# Patient Record
Sex: FEMALE | Race: WHITE | NOT HISPANIC OR LATINO | URBAN - METROPOLITAN AREA
[De-identification: names, ages, dates, MRNs, and addresses within clinical notes are randomized per-mention and may not be internally consistent; named-entity substitution may affect disease eponyms.]

---

## 2023-09-13 ENCOUNTER — INPATIENT (INPATIENT)
Facility: HOSPITAL | Age: 83
LOS: 5 days | Discharge: HOME HEALTH | DRG: 247 | End: 2023-09-19
Attending: STUDENT IN AN ORGANIZED HEALTH CARE EDUCATION/TRAINING PROGRAM | Admitting: INTERNAL MEDICINE
Payer: MEDICARE

## 2023-09-13 VITALS
OXYGEN SATURATION: 100 % | HEART RATE: 89 BPM | RESPIRATION RATE: 18 BRPM | TEMPERATURE: 98 F | WEIGHT: 179.9 LBS | SYSTOLIC BLOOD PRESSURE: 200 MMHG | HEIGHT: 66 IN | DIASTOLIC BLOOD PRESSURE: 95 MMHG

## 2023-09-13 DIAGNOSIS — I21.3 ST ELEVATION (STEMI) MYOCARDIAL INFARCTION OF UNSPECIFIED SITE: ICD-10-CM

## 2023-09-13 LAB
ALBUMIN SERPL ELPH-MCNC: 4.8 G/DL — SIGNIFICANT CHANGE UP (ref 3.5–5.2)
ALP SERPL-CCNC: 81 U/L — SIGNIFICANT CHANGE UP (ref 30–115)
ALT FLD-CCNC: 15 U/L — SIGNIFICANT CHANGE UP (ref 0–41)
ANION GAP SERPL CALC-SCNC: 12 MMOL/L — SIGNIFICANT CHANGE UP (ref 7–14)
APTT BLD: 25.7 SEC — LOW (ref 27–39.2)
AST SERPL-CCNC: 34 U/L — SIGNIFICANT CHANGE UP (ref 0–41)
BASOPHILS # BLD AUTO: 0.1 K/UL — SIGNIFICANT CHANGE UP (ref 0–0.2)
BASOPHILS NFR BLD AUTO: 0.9 % — SIGNIFICANT CHANGE UP (ref 0–1)
BILIRUB SERPL-MCNC: 0.4 MG/DL — SIGNIFICANT CHANGE UP (ref 0.2–1.2)
BUN SERPL-MCNC: 16 MG/DL — SIGNIFICANT CHANGE UP (ref 10–20)
CALCIUM SERPL-MCNC: 9.5 MG/DL — SIGNIFICANT CHANGE UP (ref 8.4–10.5)
CHLORIDE SERPL-SCNC: 99 MMOL/L — SIGNIFICANT CHANGE UP (ref 98–110)
CO2 SERPL-SCNC: 28 MMOL/L — SIGNIFICANT CHANGE UP (ref 17–32)
CREAT SERPL-MCNC: 0.8 MG/DL — SIGNIFICANT CHANGE UP (ref 0.7–1.5)
EGFR: 73 ML/MIN/1.73M2 — SIGNIFICANT CHANGE UP
EOSINOPHIL # BLD AUTO: 0.17 K/UL — SIGNIFICANT CHANGE UP (ref 0–0.7)
EOSINOPHIL NFR BLD AUTO: 1.6 % — SIGNIFICANT CHANGE UP (ref 0–8)
GLUCOSE BLDC GLUCOMTR-MCNC: 102 MG/DL — HIGH (ref 70–99)
GLUCOSE SERPL-MCNC: 138 MG/DL — HIGH (ref 70–99)
HCT VFR BLD CALC: 40.2 % — SIGNIFICANT CHANGE UP (ref 37–47)
HGB BLD-MCNC: 13.1 G/DL — SIGNIFICANT CHANGE UP (ref 12–16)
IMM GRANULOCYTES NFR BLD AUTO: 0.2 % — SIGNIFICANT CHANGE UP (ref 0.1–0.3)
INR BLD: 1.05 RATIO — SIGNIFICANT CHANGE UP (ref 0.65–1.3)
LYMPHOCYTES # BLD AUTO: 3.92 K/UL — HIGH (ref 1.2–3.4)
LYMPHOCYTES # BLD AUTO: 36.3 % — SIGNIFICANT CHANGE UP (ref 20.5–51.1)
MCHC RBC-ENTMCNC: 28.2 PG — SIGNIFICANT CHANGE UP (ref 27–31)
MCHC RBC-ENTMCNC: 32.6 G/DL — SIGNIFICANT CHANGE UP (ref 32–37)
MCV RBC AUTO: 86.6 FL — SIGNIFICANT CHANGE UP (ref 81–99)
MONOCYTES # BLD AUTO: 0.72 K/UL — HIGH (ref 0.1–0.6)
MONOCYTES NFR BLD AUTO: 6.7 % — SIGNIFICANT CHANGE UP (ref 1.7–9.3)
NEUTROPHILS # BLD AUTO: 5.87 K/UL — SIGNIFICANT CHANGE UP (ref 1.4–6.5)
NEUTROPHILS NFR BLD AUTO: 54.3 % — SIGNIFICANT CHANGE UP (ref 42.2–75.2)
NRBC # BLD: 0 /100 WBCS — SIGNIFICANT CHANGE UP (ref 0–0)
PLATELET # BLD AUTO: 248 K/UL — SIGNIFICANT CHANGE UP (ref 130–400)
PMV BLD: 10.3 FL — SIGNIFICANT CHANGE UP (ref 7.4–10.4)
POTASSIUM SERPL-MCNC: 4.7 MMOL/L — SIGNIFICANT CHANGE UP (ref 3.5–5)
POTASSIUM SERPL-SCNC: 4.7 MMOL/L — SIGNIFICANT CHANGE UP (ref 3.5–5)
PROT SERPL-MCNC: 7.5 G/DL — SIGNIFICANT CHANGE UP (ref 6–8)
PROTHROM AB SERPL-ACNC: 12 SEC — SIGNIFICANT CHANGE UP (ref 9.95–12.87)
RBC # BLD: 4.64 M/UL — SIGNIFICANT CHANGE UP (ref 4.2–5.4)
RBC # FLD: 13.9 % — SIGNIFICANT CHANGE UP (ref 11.5–14.5)
SODIUM SERPL-SCNC: 139 MMOL/L — SIGNIFICANT CHANGE UP (ref 135–146)
TROPONIN T SERPL-MCNC: <0.01 NG/ML — SIGNIFICANT CHANGE UP
WBC # BLD: 10.8 K/UL — SIGNIFICANT CHANGE UP (ref 4.8–10.8)
WBC # FLD AUTO: 10.8 K/UL — SIGNIFICANT CHANGE UP (ref 4.8–10.8)

## 2023-09-13 PROCEDURE — 80053 COMPREHEN METABOLIC PANEL: CPT

## 2023-09-13 PROCEDURE — 85730 THROMBOPLASTIN TIME PARTIAL: CPT

## 2023-09-13 PROCEDURE — 83036 HEMOGLOBIN GLYCOSYLATED A1C: CPT

## 2023-09-13 PROCEDURE — C1874: CPT

## 2023-09-13 PROCEDURE — 93306 TTE W/DOPPLER COMPLETE: CPT

## 2023-09-13 PROCEDURE — 85027 COMPLETE CBC AUTOMATED: CPT

## 2023-09-13 PROCEDURE — 85025 COMPLETE CBC W/AUTO DIFF WBC: CPT

## 2023-09-13 PROCEDURE — C9600: CPT | Mod: RC

## 2023-09-13 PROCEDURE — 84484 ASSAY OF TROPONIN QUANT: CPT

## 2023-09-13 PROCEDURE — C9606: CPT | Mod: LC

## 2023-09-13 PROCEDURE — C1894: CPT

## 2023-09-13 PROCEDURE — 93010 ELECTROCARDIOGRAM REPORT: CPT

## 2023-09-13 PROCEDURE — 97162 PT EVAL MOD COMPLEX 30 MIN: CPT | Mod: GP

## 2023-09-13 PROCEDURE — 71045 X-RAY EXAM CHEST 1 VIEW: CPT

## 2023-09-13 PROCEDURE — 87086 URINE CULTURE/COLONY COUNT: CPT

## 2023-09-13 PROCEDURE — 87186 SC STD MICRODIL/AGAR DIL: CPT

## 2023-09-13 PROCEDURE — 81001 URINALYSIS AUTO W/SCOPE: CPT

## 2023-09-13 PROCEDURE — 93005 ELECTROCARDIOGRAM TRACING: CPT

## 2023-09-13 PROCEDURE — C1887: CPT

## 2023-09-13 PROCEDURE — 36415 COLL VENOUS BLD VENIPUNCTURE: CPT

## 2023-09-13 PROCEDURE — 93458 L HRT ARTERY/VENTRICLE ANGIO: CPT | Mod: 59

## 2023-09-13 PROCEDURE — 80048 BASIC METABOLIC PNL TOTAL CA: CPT

## 2023-09-13 PROCEDURE — C1769: CPT

## 2023-09-13 PROCEDURE — 84443 ASSAY THYROID STIM HORMONE: CPT

## 2023-09-13 PROCEDURE — 82962 GLUCOSE BLOOD TEST: CPT

## 2023-09-13 PROCEDURE — 83735 ASSAY OF MAGNESIUM: CPT

## 2023-09-13 PROCEDURE — 84100 ASSAY OF PHOSPHORUS: CPT

## 2023-09-13 PROCEDURE — 80061 LIPID PANEL: CPT

## 2023-09-13 PROCEDURE — 99285 EMERGENCY DEPT VISIT HI MDM: CPT | Mod: FS

## 2023-09-13 PROCEDURE — 85610 PROTHROMBIN TIME: CPT

## 2023-09-13 PROCEDURE — C1725: CPT

## 2023-09-13 RX ORDER — ATORVASTATIN CALCIUM 80 MG/1
80 TABLET, FILM COATED ORAL AT BEDTIME
Refills: 0 | Status: DISCONTINUED | OUTPATIENT
Start: 2023-09-13 | End: 2023-09-19

## 2023-09-13 RX ORDER — TICAGRELOR 90 MG/1
180 TABLET ORAL ONCE
Refills: 0 | Status: COMPLETED | OUTPATIENT
Start: 2023-09-13 | End: 2023-09-13

## 2023-09-13 RX ORDER — METOPROLOL TARTRATE 50 MG
12.5 TABLET ORAL EVERY 12 HOURS
Refills: 0 | Status: DISCONTINUED | OUTPATIENT
Start: 2023-09-13 | End: 2023-09-14

## 2023-09-13 RX ORDER — HEPARIN SODIUM 5000 [USP'U]/ML
4000 INJECTION INTRAVENOUS; SUBCUTANEOUS ONCE
Refills: 0 | Status: COMPLETED | OUTPATIENT
Start: 2023-09-13 | End: 2023-09-13

## 2023-09-13 RX ORDER — TICAGRELOR 90 MG/1
90 TABLET ORAL EVERY 12 HOURS
Refills: 0 | Status: DISCONTINUED | OUTPATIENT
Start: 2023-09-14 | End: 2023-09-19

## 2023-09-13 RX ORDER — NITROGLYCERIN 6.5 MG
0.4 CAPSULE, EXTENDED RELEASE ORAL ONCE
Refills: 0 | Status: COMPLETED | OUTPATIENT
Start: 2023-09-13 | End: 2023-09-13

## 2023-09-13 RX ORDER — HEPARIN SODIUM 5000 [USP'U]/ML
4000 INJECTION INTRAVENOUS; SUBCUTANEOUS EVERY 6 HOURS
Refills: 0 | Status: DISCONTINUED | OUTPATIENT
Start: 2023-09-13 | End: 2023-09-13

## 2023-09-13 RX ORDER — MORPHINE SULFATE 50 MG/1
4 CAPSULE, EXTENDED RELEASE ORAL ONCE
Refills: 0 | Status: DISCONTINUED | OUTPATIENT
Start: 2023-09-13 | End: 2023-09-13

## 2023-09-13 RX ORDER — HEPARIN SODIUM 5000 [USP'U]/ML
INJECTION INTRAVENOUS; SUBCUTANEOUS
Qty: 25000 | Refills: 0 | Status: DISCONTINUED | OUTPATIENT
Start: 2023-09-13 | End: 2023-09-13

## 2023-09-13 RX ORDER — ASPIRIN/CALCIUM CARB/MAGNESIUM 324 MG
81 TABLET ORAL DAILY
Refills: 0 | Status: DISCONTINUED | OUTPATIENT
Start: 2023-09-13 | End: 2023-09-19

## 2023-09-13 RX ADMIN — Medication 0.4 MILLIGRAM(S): at 21:45

## 2023-09-13 RX ADMIN — HEPARIN SODIUM 4000 UNIT(S): 5000 INJECTION INTRAVENOUS; SUBCUTANEOUS at 21:59

## 2023-09-13 RX ADMIN — TICAGRELOR 180 MILLIGRAM(S): 90 TABLET ORAL at 21:57

## 2023-09-13 RX ADMIN — MORPHINE SULFATE 4 MILLIGRAM(S): 50 CAPSULE, EXTENDED RELEASE ORAL at 21:46

## 2023-09-13 RX ADMIN — HEPARIN SODIUM 1000 UNIT(S)/HR: 5000 INJECTION INTRAVENOUS; SUBCUTANEOUS at 22:00

## 2023-09-13 NOTE — CHART NOTE - NSCHARTNOTEFT_GEN_A_CORE
PRE-OP DIAGNOSIS:    STEMI    PROCEDURE:   [x ] Coronary Angiogram   [x ] LHC   [ ] LVG   [ ] RHC   [x ] Intervention (see below)       PHYSICIAN:  Dr. Womack   FELLOW: Emy    PROCEDURE DESCRIPTION:     Consent:    [x] Patient   [] Family Member   []  Used      Anesthesia:   [ ] General   [X] Sedation   [X] Local     Access & Closure:   [x ]  6 Fr R   Radial Artery  -> D-stat       IV Contrast: 150 mL      Intervention:  Successful PCI of OM1 with balloon angioplasty s/p VIET x 2    Implants:  [place] 2.5 x 15 mm, [place] 2.75 x 22 mm to OM1                  AUC: 9     FINDINGS:   Coronary Dominance: Right  LM: Mild disease diffusely  LAD: Mild disease diffusely  D1: Diffusely disease  CX: Proximal segment 70% stenosis, distal segment 50% stenosis  OM1: Two segments with focal stenosis - 90% and 95%  OM2: Diffuse mild disease  RCA: Mid segment 90% stenosis, distal segment mild disease  RPDA: severe disease, small    LVEDP:  16mmHg      ESTIMATED BLOOD LOSS: < 10 mL      CONDITION:   [x] Good   [ ] Fair   [ ] Critical     SPECIMEN REMOVED: N/A     POST-OP DIAGNOSIS:    - Severe 2 vessel disease - OM1 and RCA  - s/p successful PCI of OM1 with balloon angioplasty s/p VIET x 2      PLAN OF CARE:   [x] Admit to CCU  [x] Plan for Staged PCI of RCA in future  [X] Medications: ASA, Brilinta, statin, BB  [X] IV Fluids: NS @ 150cc/h x 8    [x] Remove D-stat   TR band    femoral sheath and Hold manual pressure if signs of hematoma or bleeding over radial     femoral access site.  [x] Smoking cessation PRE-OP DIAGNOSIS:    STEMI    PROCEDURE:   [x ] Coronary Angiogram   [x ] LHC   [ ] LVG   [ ] RHC   [x ] Intervention (see below)       PHYSICIAN:  Dr. Womack   FELLOW: Emy    PROCEDURE DESCRIPTION:     Consent:    [x] Patient   [] Family Member   []  Used      Anesthesia:   [ ] General   [X] Sedation   [X] Local     Access & Closure:   [x ]  6 Fr R   Radial Artery  -> D-stat       IV Contrast: 150 mL      Intervention:  Successful PCI of OM1 with balloon angioplasty s/p VIET x 2    Implants:  Steven [place] 2.5 x 15 mm, [place] 2.75 x 22 mm to OM1                  AUC: 9     FINDINGS:   Coronary Dominance: Right  LM: Mild disease diffusely  LAD: Mild disease diffusely  D1: Diffusely disease  CX: Proximal segment 70% stenosis, distal segment 50% stenosis  OM1: Two segments with focal stenosis - 90% and 95%  OM2: Diffuse mild disease  RCA: Mid segment 90% stenosis, distal segment mild disease  RPDA: severe disease, small    LVEDP:  16mmHg      ESTIMATED BLOOD LOSS: < 10 mL      CONDITION:   [x] Good   [ ] Fair   [ ] Critical     SPECIMEN REMOVED: N/A     POST-OP DIAGNOSIS:    - Severe 2 vessel disease - OM1 and RCA  - s/p successful PCI of OM1 with balloon angioplasty s/p VIET x 2      PLAN OF CARE:   [x] Admit to CCU  [x] Plan for Staged PCI of RCA in future  [X] Medications: ASA, Brilinta, statin, BB  [X] IV Fluids: NS @ 150cc/h x 8 hr  [x] Remove D-stat PRE-OP DIAGNOSIS:    STEMI    PROCEDURE:   [x ] Coronary Angiogram   [x ] LHC   [ ] LVG   [ ] RHC   [x ] Intervention (see below)       PHYSICIAN:  Dr. Womack   FELLOW: Emy    PROCEDURE DESCRIPTION:     Consent:    [x] Patient   [] Family Member   []  Used      Anesthesia:   [ ] General   [X] Sedation   [X] Local     Access & Closure:   [x ]  6 Fr R   Radial Artery  -> D-stat       IV Contrast: 150 mL      Intervention:  Successful PCI of OM1 with balloon angioplasty s/p VIET x 2    Implants:  Steven [place] 2.5 x 15 mm, [place] 2.75 x 22 mm to OM1                  AUC: 9     FINDINGS:   Coronary Dominance: Right  LM: Mild disease diffusely  LAD: Mild disease diffusely  D1: Diffusely disease  CX: Proximal segment 70% stenosis, distal segment 50% stenosis  OM1: Two segments with focal stenosis - 90% and 95%  OM2: Diffuse mild disease  RCA: Mid segment 90% stenosis, distal segment mild disease  RPDA: severe disease, small    LVEDP:  16mmHg      ESTIMATED BLOOD LOSS: < 10 mL      CONDITION:   [x] Good   [ ] Fair   [ ] Critical     SPECIMEN REMOVED: N/A     POST-OP DIAGNOSIS:    - Severe 2 vessel disease - OM1 and RCA  - s/p successful PCI of OM1 with balloon angioplasty s/p VIET x 2      PLAN OF CARE:   [x] Admit to CCU  [x] Plan for Staged PCI of RCA in the near future  [X] Medications: ASA, Brilinta, statin, BB  [X] IV Fluids: NS @ 150cc/h x 8 hr  [x] Remove D-stat

## 2023-09-13 NOTE — H&P ADULT - NSHPLABSRESULTS_GEN_ALL_CORE
13.1   10.80 )-----------( 248      ( 13 Sep 2023 21:40 )             40.2       09-13    139  |  99  |  16  ----------------------------<  138<H>  4.7   |  28  |  0.8    Ca    9.5      13 Sep 2023 21:40    TPro  7.5  /  Alb  4.8  /  TBili  0.4  /  DBili  x   /  AST  34  /  ALT  15  /  AlkPhos  81  09-13              Urinalysis Basic - ( 13 Sep 2023 21:40 )    Color: x / Appearance: x / SG: x / pH: x  Gluc: 138 mg/dL / Ketone: x  / Bili: x / Urobili: x   Blood: x / Protein: x / Nitrite: x   Leuk Esterase: x / RBC: x / WBC x   Sq Epi: x / Non Sq Epi: x / Bacteria: x        PT/INR - ( 13 Sep 2023 21:40 )   PT: 12.00 sec;   INR: 1.05 ratio         PTT - ( 13 Sep 2023 21:40 )  PTT:25.7 sec    Lactate Trend      CARDIAC MARKERS ( 13 Sep 2023 21:40 )  x     / <0.01 ng/mL / x     / x     / x            CAPILLARY BLOOD GLUCOSE      POCT Blood Glucose.: 102 mg/dL (13 Sep 2023 23:56)

## 2023-09-13 NOTE — ED PROVIDER NOTE - NS ED ATTENDING STATEMENT MOD
This was a shared visit with the ANETTE. I reviewed and verified the documentation and independently performed the documented:

## 2023-09-13 NOTE — ED PROVIDER NOTE - OBJECTIVE STATEMENT
patient c/o mid chest pain, tight pressure feeling, sudden onset 1 hour PTA, no SOB, no abd pain, no N/V,

## 2023-09-13 NOTE — H&P ADULT - HISTORY OF PRESENT ILLNESS
83y F pmh hypothyroidism, peripheral neuropathy presenting with chest pain. Patient states she started having midsternal/epigastric chest pain for ~1hr that started after eating. Pain described as non-radiating, non-exertional burning chest pain ?/10 that did not resolve with rest. Patient took baking soda and ASA 325mg without relief prompting her to come to the ED. She denies any associated sxs of nausea, vomiting, shortness of breath, palpitations, diaphoresis, dizziness, confusion or lightheadedness.     Patient has no prior cardiac history. Does not smoke, no etoh or recreational drugs. Denies any prior dyspnea or exertional chest pain prior to today.     Vitals in the ED  T 98.5  HR 89  /95  RR 18 SpO2 100 On RA    Significant Labs  wbc 10.8  hgb13.1  plt 248  Na 139 K 4.7  BUN/Cr 16/0.8  trop wnl     EKG: +ve ST elevations in posterior leads     Patient received brilinta load, heparin, morphine and nitroglycerin SL in the ED. Code STEMI called. Patient transferred to [facility] for emergent cath. Admitted to CCU.  83y F pmh hypothyroidism, radicular neuropathy presenting with chest pain. Patient states she started having midsternal/epigastric chest pain for ~1hr that started after eating. Pain described as non-radiating, non-exertional burning chest pain ?/10 that did not resolve with rest. Pt explained she felt like food was stuck in her throat. Patient took baking soda and ASA 325mg without relief prompting her to come to the ED. She denies any associated sxs of nausea, vomiting, shortness of breath, palpitations, diaphoresis, dizziness, confusion or lightheadedness.     Family states she does not have any prior cardiac history. Her  explains she once saw a Cardiologist in [place] who said she had evidence of a "leaky heart valve" but did not have any follow up. She is a prior smoker quit 35-40yrs ago. No alcohol or recreational drug use, patient only takes levothyroxine 75mg and tramadol for back/radicular pain and multivitamins. Denies any prior hx of dyspnea on exertion or exertional chest pain prior to today.     Vitals in the ED  T 98.5  HR 89  /95  RR 18 SpO2 100 On RA    Significant Labs  wbc 10.8  hgb13.1  plt 248  Na 139 K 4.7  BUN/Cr 16/0.8  trop wnl     EKG: ST elevations lateral leads, concern for acute lateral wall ischemia     Patient received Brilinta load, heparin, morphine and nitroglycerin SL in the ED. Code STEMI called. Patient transferred to [facility] for emergent cath. Admitted to CCU.  83y F pmh hypothyroidism, back pain and peripheral neuropathy presenting with chest pain. Patient states she started having midsternal/epigastric chest pain for ~1hr that started after eating. Pain described as non-radiating, non-exertional burning chest pain ?/10 that did not resolve with rest. Pt explained she felt like food was stuck in her throat. Patient took baking soda and ASA 325mg without relief prompting her to come to the ED. She denies any associated sxs of nausea, vomiting, shortness of breath, palpitations, diaphoresis, dizziness, confusion or lightheadedness.     Family states she does not have any prior cardiac history. Her  explains she once saw a Cardiologist in [place] who said she had evidence of a "leaky heart valve" but did not have any follow up. She is a prior smoker quit 35-40yrs ago. No alcohol or recreational drug use, patient only takes levothyroxine 75mg and tramadol for back/radicular pain and multivitamins. Denies any prior hx of dyspnea on exertion or exertional chest pain prior to today.     Vitals in the ED  T 98.5  HR 89  /95  RR 18 SpO2 100 On RA    Significant Labs  wbc 10.8  hgb13.1  plt 248  Na 139 K 4.7  BUN/Cr 16/0.8  trop wnl     EKG: ST elevations lateral leads, concern for acute lateral wall ischemia     Patient received Brilinta load, heparin, morphine and nitroglycerin SL in the ED. Code STEMI called. Patient transferred to [facility] for emergent cath. Admitted to CCU.

## 2023-09-13 NOTE — H&P ADULT - ASSESSMENT
IMPRESSION:  83y F pmh hypothyroidism, radicular neuropathy presenting with non-exertional chest pain admitted for STEMI.     #ACS  #STEMI s/p PCI   #Severe 2-vessel disease OM1 and RCA  #Hx Hypothyroidism     PLAN:    CNS: Avoid CNS Depressants.    HEENT: Oral care. Aspiration precautions     PULMONARY: HOB @ 45. Monitor Pulse Ox. Keep > 93%. Supplement as needed.    CARDIOVASCULAR:   - s/p PCI w/ VIET to OM1x2  - c/w asa 81qd, brilinta 90mg bid, lipitor 80mg qhs, metoprolol 12.5mg bid  - IVF NS @ 150cc x 8hr  - plan for staged PCI to RCA prior to d/c    GI: GI prophylaxis. DASH/TLC    RENAL: Avoid nephrotoxic agents     INFECTIOUS DISEASE: monitor off abx    HEMATOLOGICAL: DVT prophylaxis     ENDOCRINE: Monitor FS. C/w levothyroxine    MUSCULOSKELETAL: Ambulate as tolerated. Remove R Radial Hemostat.    CODE STATUS: Full code    DISPOSITION: Monitor in CCU    IMPRESSION:  83y F pmh hypothyroidism, radicular neuropathy presenting with non-exertional chest pain admitted for STEMI.     #ACS  #STEMI s/p PCI   #Severe 2-vessel disease OM1 and RCA  #Hx Hypothyroidism     PLAN:    CNS: Avoid CNS Depressants.    HEENT: Oral care. Aspiration precautions     PULMONARY: HOB @ 45. Monitor Pulse Ox. Keep > 93%. Supplement as needed.    CARDIOVASCULAR:   - s/p PCI w/ VIET to OM1x2  - c/w asa 81qd, brilinta 90mg bid, lipitor 80mg qhs, metoprolol 12.5mg bid  - TTE   - IVF NS @ 150cc x 8hr  - plan for staged PCI to RCA prior to d/c  - GORGE score 4 - 7.3% risk of all-cause mortality at 30 days.    GI: GI prophylaxis. DASH Diet.     RENAL: Avoid nephrotoxic agents. Cr stable. Gentle hydration post PCI.     INFECTIOUS DISEASE: monitor off abx.     HEMATOLOGICAL: DVT Lovenox 40qD    ENDOCRINE: Monitor FS. C/w levothyroxine. TSH, lipid panel,  A1c.    MUSCULOSKELETAL: Ambulate as tolerated. Remove R Radial Hemostat.    CODE STATUS: Full code    DISPOSITION: Monitor in CCU    IMPRESSION:  83y F pmh hypothyroidism, back pain and peripheral neuropathy presenting with non-exertional chest pain admitted for STEMI.     #ACS  #STEMI s/p PCI, VIET x2 to OM1  #Severe 2-vessel disease OM1 and RCA  #Hx Hypothyroidism     PLAN:    CNS: Avoid CNS Depressants.    HEENT: Oral care. Aspiration precautions     PULMONARY: HOB @ 45. Monitor Pulse Ox. Keep > 93%. Supplement as needed.    CARDIOVASCULAR:   - s/p PCI w/ VIET to OM1x2  - c/w asa 81qd, brilinta 90mg bid, lipitor 80mg qhs, metoprolol 12.5mg bid  - TTE   - IVF NS @ 150cc x 8hr  - plan for staged PCI to RCA prior to d/c  - GORGE score 4 - 7.3% risk of all-cause mortality at 30 days.    GI: GI prophylaxis. DASH Diet.     RENAL: Avoid nephrotoxic agents. Cr stable. Gentle hydration post PCI.     INFECTIOUS DISEASE: monitor off abx.     HEMATOLOGICAL: DVT Lovenox 40qD    ENDOCRINE: Monitor FS. C/w levothyroxine. TSH, lipid panel,  A1c.    MUSCULOSKELETAL: Ambulate as tolerated. Remove R Radial Hemostat.    CODE STATUS: Full code    DISPOSITION: Monitor in CCU

## 2023-09-13 NOTE — ED ADULT NURSE NOTE - NSFALLHARMRISKINTERV_ED_ALL_ED
Assistance OOB with selected safe patient handling equipment if applicable/Assistance with ambulation/Communicate risk of Fall with Harm to all staff, patient, and family/Monitor gait and stability/Provide visual cue: red socks, yellow wristband, yellow gown, etc/Reinforce activity limits and safety measures with patient and family/Bed in lowest position, wheels locked, appropriate side rails in place/Call bell, personal items and telephone in reach/Instruct patient to call for assistance before getting out of bed/chair/stretcher/Non-slip footwear applied when patient is off stretcher/[place] to call system/Physically safe environment - no spills, clutter or unnecessary equipment/Purposeful Proactive Rounding/Room/bathroom lighting operational, light cord in reach

## 2023-09-13 NOTE — ED PROVIDER NOTE - ATTENDING APP SHARED VISIT CONTRIBUTION OF CARE
84 yo F, hx of hypothyroidism, peripheral neuropathy, here for assessment of acute onset midsternal/epigastric CP about 1 hour PTA after eating. Pain associated with nausea, no vomiting. No dyspnea, diaphoresis, dizziness. No previous MI. Patient thought this was heartburn, took baking soda and then 325mg of asa, no change in pain prompting ED visit.    On arrival patient hypertensive, in no distress, clear lungs, RRR, soft, NT, ND abdomen.    EKG with acute MI, STEMI code activated at 2132. Spoke with Dr. Quevedo, cardiology fellow, patient to be transferred emergently to [facility] ED for cath. Will load with Brillinta, Heparin, admit to Dr. Womack via cath lab.

## 2023-09-13 NOTE — H&P ADULT - NSHPPHYSICALEXAM_GEN_ALL_CORE
GEN: NAD, Resting comfortably in bed, cooperative, elderly, fatigued  PULM: Clear to auscultation bilaterally, No wheezing, rales, rhonchi, not in respiratory distress, on room air   CVS: Regular rate and rhythm, S1-S2, no murmurs, heaves, thrills  ABD: Soft, non-tender, non-distended, no guarding, BS +  EXT: No edema/cyanosis, extremities warm/dry, peripheral pulses palpable, RUE D-stat, no bruising/hematoma   NEURO: A&Ox3, no focal deficits, follows commands, answers appropriately

## 2023-09-13 NOTE — CHART NOTE - NSCHARTNOTEFT_GEN_A_CORE
Code STEMI was called in ED south. I immediately responded and contacted  the ED south. EKG reviewed and case discussed with interventional cardiologist on call. Will transfer patient [place] to cardiac catheterization lab for emergent revascularization.    Further recommendations to follow post emergent cardiac catheterization Code STEMI was called in ED south. I immediately responded and contacted  the ED south. EKG reviewed and case discussed with interventional cardiologist on call -Dr. Womack. Will transfer patient [place] to cardiac catheterization lab for emergent revascularization.    Further recommendations to follow post emergent cardiac catheterization

## 2023-09-13 NOTE — ED PROVIDER NOTE - CLINICAL SUMMARY MEDICAL DECISION MAKING FREE TEXT BOX
EKG with acute MI, STEMI code activated at 2132. Spoke with Dr. Quevedo, cardiology fellow, patient to be transferred emergently to [facility] ED for cath. Will load with Nasir Gillis, admit to Dr. Womack via cath lab.

## 2023-09-14 DIAGNOSIS — Z90.49 ACQUIRED ABSENCE OF OTHER SPECIFIED PARTS OF DIGESTIVE TRACT: Chronic | ICD-10-CM

## 2023-09-14 LAB
A1C WITH ESTIMATED AVERAGE GLUCOSE RESULT: 5.9 % — HIGH (ref 4–5.6)
A1C WITH ESTIMATED AVERAGE GLUCOSE RESULT: 5.9 % — HIGH (ref 4–5.6)
ALBUMIN SERPL ELPH-MCNC: 4.2 G/DL — SIGNIFICANT CHANGE UP (ref 3.5–5.2)
ALP SERPL-CCNC: 82 U/L — SIGNIFICANT CHANGE UP (ref 30–115)
ALT FLD-CCNC: 13 U/L — SIGNIFICANT CHANGE UP (ref 0–41)
ANION GAP SERPL CALC-SCNC: 14 MMOL/L — SIGNIFICANT CHANGE UP (ref 7–14)
APPEARANCE UR: ABNORMAL
APTT BLD: 43.2 SEC — HIGH (ref 27–39.2)
AST SERPL-CCNC: 49 U/L — HIGH (ref 0–41)
BASOPHILS # BLD AUTO: 0.11 K/UL — SIGNIFICANT CHANGE UP (ref 0–0.2)
BASOPHILS NFR BLD AUTO: 0.9 % — SIGNIFICANT CHANGE UP (ref 0–1)
BILIRUB SERPL-MCNC: 0.4 MG/DL — SIGNIFICANT CHANGE UP (ref 0.2–1.2)
BILIRUB UR-MCNC: NEGATIVE — SIGNIFICANT CHANGE UP
BUN SERPL-MCNC: 14 MG/DL — SIGNIFICANT CHANGE UP (ref 10–20)
CALCIUM SERPL-MCNC: 9.4 MG/DL — SIGNIFICANT CHANGE UP (ref 8.4–10.5)
CHLORIDE SERPL-SCNC: 98 MMOL/L — SIGNIFICANT CHANGE UP (ref 98–110)
CHOLEST SERPL-MCNC: 196 MG/DL — SIGNIFICANT CHANGE UP
CO2 SERPL-SCNC: 23 MMOL/L — SIGNIFICANT CHANGE UP (ref 17–32)
COLOR SPEC: YELLOW — SIGNIFICANT CHANGE UP
CREAT SERPL-MCNC: 0.7 MG/DL — SIGNIFICANT CHANGE UP (ref 0.7–1.5)
DIFF PNL FLD: ABNORMAL
EGFR: 85 ML/MIN/1.73M2 — SIGNIFICANT CHANGE UP
EOSINOPHIL # BLD AUTO: 0.1 K/UL — SIGNIFICANT CHANGE UP (ref 0–0.7)
EOSINOPHIL NFR BLD AUTO: 0.8 % — SIGNIFICANT CHANGE UP (ref 0–8)
ESTIMATED AVERAGE GLUCOSE: 123 MG/DL — HIGH (ref 68–114)
ESTIMATED AVERAGE GLUCOSE: 123 MG/DL — HIGH (ref 68–114)
GLUCOSE SERPL-MCNC: 121 MG/DL — HIGH (ref 70–99)
GLUCOSE UR QL: NEGATIVE MG/DL — SIGNIFICANT CHANGE UP
HCT VFR BLD CALC: 43.1 % — SIGNIFICANT CHANGE UP (ref 37–47)
HDLC SERPL-MCNC: 60 MG/DL — SIGNIFICANT CHANGE UP
HGB BLD-MCNC: 13.8 G/DL — SIGNIFICANT CHANGE UP (ref 12–16)
IMM GRANULOCYTES NFR BLD AUTO: 0.4 % — HIGH (ref 0.1–0.3)
INR BLD: 1.08 RATIO — SIGNIFICANT CHANGE UP (ref 0.65–1.3)
KETONES UR-MCNC: NEGATIVE MG/DL — SIGNIFICANT CHANGE UP
LEUKOCYTE ESTERASE UR-ACNC: ABNORMAL
LIPID PNL WITH DIRECT LDL SERPL: 108 MG/DL — HIGH
LYMPHOCYTES # BLD AUTO: 23.9 % — SIGNIFICANT CHANGE UP (ref 20.5–51.1)
LYMPHOCYTES # BLD AUTO: 3.02 K/UL — SIGNIFICANT CHANGE UP (ref 1.2–3.4)
MAGNESIUM SERPL-MCNC: 2.2 MG/DL — SIGNIFICANT CHANGE UP (ref 1.8–2.4)
MCHC RBC-ENTMCNC: 28.6 PG — SIGNIFICANT CHANGE UP (ref 27–31)
MCHC RBC-ENTMCNC: 32 G/DL — SIGNIFICANT CHANGE UP (ref 32–37)
MCV RBC AUTO: 89.2 FL — SIGNIFICANT CHANGE UP (ref 81–99)
MONOCYTES # BLD AUTO: 0.94 K/UL — HIGH (ref 0.1–0.6)
MONOCYTES NFR BLD AUTO: 7.4 % — SIGNIFICANT CHANGE UP (ref 1.7–9.3)
NEUTROPHILS # BLD AUTO: 8.43 K/UL — HIGH (ref 1.4–6.5)
NEUTROPHILS NFR BLD AUTO: 66.6 % — SIGNIFICANT CHANGE UP (ref 42.2–75.2)
NITRITE UR-MCNC: POSITIVE
NON HDL CHOLESTEROL: 136 MG/DL — HIGH
NRBC # BLD: 0 /100 WBCS — SIGNIFICANT CHANGE UP (ref 0–0)
PH UR: 8 — SIGNIFICANT CHANGE UP (ref 5–8)
PLATELET # BLD AUTO: 240 K/UL — SIGNIFICANT CHANGE UP (ref 130–400)
PMV BLD: 10.1 FL — SIGNIFICANT CHANGE UP (ref 7.4–10.4)
POTASSIUM SERPL-MCNC: 4.3 MMOL/L — SIGNIFICANT CHANGE UP (ref 3.5–5)
POTASSIUM SERPL-SCNC: 4.3 MMOL/L — SIGNIFICANT CHANGE UP (ref 3.5–5)
PROT SERPL-MCNC: 7.1 G/DL — SIGNIFICANT CHANGE UP (ref 6–8)
PROT UR-MCNC: 100 MG/DL
PROTHROM AB SERPL-ACNC: 12.3 SEC — SIGNIFICANT CHANGE UP (ref 9.95–12.87)
RBC # BLD: 4.83 M/UL — SIGNIFICANT CHANGE UP (ref 4.2–5.4)
RBC # FLD: 13.8 % — SIGNIFICANT CHANGE UP (ref 11.5–14.5)
SODIUM SERPL-SCNC: 135 MMOL/L — SIGNIFICANT CHANGE UP (ref 135–146)
SP GR SPEC: >1.03 — HIGH (ref 1–1.03)
TRIGL SERPL-MCNC: 141 MG/DL — SIGNIFICANT CHANGE UP
TROPONIN T SERPL-MCNC: 0.61 NG/ML — CRITICAL HIGH
TSH SERPL-MCNC: 2.18 UIU/ML — SIGNIFICANT CHANGE UP (ref 0.27–4.2)
TSH SERPL-MCNC: 2.2 UIU/ML — SIGNIFICANT CHANGE UP (ref 0.27–4.2)
UROBILINOGEN FLD QL: 0.2 MG/DL — SIGNIFICANT CHANGE UP (ref 0.2–1)
WBC # BLD: 12.65 K/UL — HIGH (ref 4.8–10.8)
WBC # FLD AUTO: 12.65 K/UL — HIGH (ref 4.8–10.8)

## 2023-09-14 PROCEDURE — 93010 ELECTROCARDIOGRAM REPORT: CPT

## 2023-09-14 PROCEDURE — 99232 SBSQ HOSP IP/OBS MODERATE 35: CPT

## 2023-09-14 PROCEDURE — 93306 TTE W/DOPPLER COMPLETE: CPT | Mod: 26

## 2023-09-14 RX ORDER — LOSARTAN POTASSIUM 100 MG/1
25 TABLET, FILM COATED ORAL DAILY
Refills: 0 | Status: DISCONTINUED | OUTPATIENT
Start: 2023-09-14 | End: 2023-09-19

## 2023-09-14 RX ORDER — TICAGRELOR 90 MG/1
1 TABLET ORAL
Qty: 60 | Refills: 3
Start: 2023-09-14 | End: 2024-01-11

## 2023-09-14 RX ORDER — LEVOTHYROXINE SODIUM 125 MCG
75 TABLET ORAL DAILY
Refills: 0 | Status: DISCONTINUED | OUTPATIENT
Start: 2023-09-14 | End: 2023-09-19

## 2023-09-14 RX ORDER — SENNA PLUS 8.6 MG/1
1 TABLET ORAL ONCE
Refills: 0 | Status: COMPLETED | OUTPATIENT
Start: 2023-09-14 | End: 2023-09-14

## 2023-09-14 RX ORDER — METOPROLOL TARTRATE 50 MG
25 TABLET ORAL
Refills: 0 | Status: DISCONTINUED | OUTPATIENT
Start: 2023-09-14 | End: 2023-09-17

## 2023-09-14 RX ORDER — LEVOTHYROXINE SODIUM 125 MCG
1 TABLET ORAL
Refills: 0 | DISCHARGE

## 2023-09-14 RX ORDER — CHLORHEXIDINE GLUCONATE 213 G/1000ML
1 SOLUTION TOPICAL
Refills: 0 | Status: DISCONTINUED | OUTPATIENT
Start: 2023-09-14 | End: 2023-09-19

## 2023-09-14 RX ORDER — INFLUENZA VIRUS VACCINE 15; 15; 15; 15 UG/.5ML; UG/.5ML; UG/.5ML; UG/.5ML
0.7 SUSPENSION INTRAMUSCULAR ONCE
Refills: 0 | Status: COMPLETED | OUTPATIENT
Start: 2023-09-14 | End: 2023-09-14

## 2023-09-14 RX ORDER — ONDANSETRON 8 MG/1
4 TABLET, FILM COATED ORAL ONCE
Refills: 0 | Status: DISCONTINUED | OUTPATIENT
Start: 2023-09-14 | End: 2023-09-19

## 2023-09-14 RX ORDER — ENOXAPARIN SODIUM 100 MG/ML
40 INJECTION SUBCUTANEOUS EVERY 24 HOURS
Refills: 0 | Status: DISCONTINUED | OUTPATIENT
Start: 2023-09-14 | End: 2023-09-17

## 2023-09-14 RX ORDER — ACETAMINOPHEN 500 MG
650 TABLET ORAL EVERY 6 HOURS
Refills: 0 | Status: DISCONTINUED | OUTPATIENT
Start: 2023-09-14 | End: 2023-09-19

## 2023-09-14 RX ADMIN — LOSARTAN POTASSIUM 25 MILLIGRAM(S): 100 TABLET, FILM COATED ORAL at 09:20

## 2023-09-14 RX ADMIN — ATORVASTATIN CALCIUM 80 MILLIGRAM(S): 80 TABLET, FILM COATED ORAL at 21:25

## 2023-09-14 RX ADMIN — Medication 25 MILLIGRAM(S): at 17:37

## 2023-09-14 RX ADMIN — Medication 12.5 MILLIGRAM(S): at 05:58

## 2023-09-14 RX ADMIN — ENOXAPARIN SODIUM 40 MILLIGRAM(S): 100 INJECTION SUBCUTANEOUS at 06:00

## 2023-09-14 RX ADMIN — TICAGRELOR 90 MILLIGRAM(S): 90 TABLET ORAL at 17:37

## 2023-09-14 RX ADMIN — Medication 75 MICROGRAM(S): at 05:59

## 2023-09-14 RX ADMIN — SENNA PLUS 1 TABLET(S): 8.6 TABLET ORAL at 17:39

## 2023-09-14 RX ADMIN — Medication 81 MILLIGRAM(S): at 11:01

## 2023-09-14 RX ADMIN — TICAGRELOR 90 MILLIGRAM(S): 90 TABLET ORAL at 05:58

## 2023-09-14 RX ADMIN — CHLORHEXIDINE GLUCONATE 1 APPLICATION(S): 213 SOLUTION TOPICAL at 05:59

## 2023-09-14 NOTE — PROGRESS NOTE ADULT - CRITICAL CARE SERVICES
Action Requested: Summary for Provider     []  Critical Lab, Recommendations Needed  [] Need Additional Advice  [x]   FYI    []   Need Orders  [] Need Medications Sent to Pharmacy  []  Other     SUMMARY: Patient has been recently traveling to [facility]).  Lance
37

## 2023-09-14 NOTE — PROGRESS NOTE ADULT - SUBJECTIVE AND OBJECTIVE BOX
CHIEF COMPLAINT:  Patient is a 83y old  Female who presents with a chief complaint of STEMI (13 Sep 2023 23:43)      INTERVAL HISTORY/OVERNIGHT EVENTS:      ======================  MEDICATIONS:  aspirin enteric coated 81 milliGRAM(s) Oral daily  atorvastatin 80 milliGRAM(s) Oral at bedtime  chlorhexidine 2% Cloths 1 Application(s) Topical <User Schedule>  enoxaparin Injectable 40 milliGRAM(s) SubCutaneous every 24 hours  influenza  Vaccine (HIGH DOSE) 0.7 milliLiter(s) IntraMuscular once  levothyroxine 75 MICROGram(s) Oral daily  metoprolol tartrate 12.5 milliGRAM(s) Oral every 12 hours  ondansetron    Tablet 4 milliGRAM(s) Oral once  senna 1 Tablet(s) Oral once  ticagrelor 90 milliGRAM(s) Oral every 12 hours    DRIPS:    PRN:       ======================  PHYSICAL EXAMINATION:  GEN:  nad.   HEENT:  eomi. ncat  PULM:  b/l lung sounds   CARD: s1, s2  ABD: +bs. ntnd  EXT:  no new rashes.    NEURO:  no new focal deficits.   ======================  OBJECTIVE:        VS:  T(F): 97.2 (09-14 @ 00:00), Max: 98.5 (09-13 @ 21:36)  HR: 67 (09-14 @ 06:00) (64 - 113)  BP: 160/70 (09-14 @ 06:00) (121/60 - 200/95)  RR: 17 (09-14 @ 06:00) (14 - 24)  SpO2: 97% (09-14 @ 06:00) (97% - 100%)  CVP(mm Hg): --  CO: --  CI: --  PA: --  PCWP: --    I/O:      09-13 @ 07:01  -  09-14 @ 07:00  --------------------------------------------------------  IN: 1000 mL / OUT: 1050 mL / NET: -50 mL        Weight trend:  Weight (kg): 82.8 (09-14)    ======================    LABS:                          13.8   12.65 )-----------( 240      ( 14 Sep 2023 05:24 )             43.1     09-14    135  |  98  |  14  ----------------------------<  121<H>  4.3   |  23  |  0.7    Ca    9.4      14 Sep 2023 05:24  Mg     2.2     09-14    TPro  7.1  /  Alb  4.2  /  TBili  0.4  /  DBili  x   /  AST  49<H>  /  ALT  13  /  AlkPhos  82  09-14    LIVER FUNCTIONS - ( 14 Sep 2023 05:24 )  Alb: 4.2 g/dL / Pro: 7.1 g/dL / ALK PHOS: 82 U/L / ALT: 13 U/L / AST: 49 U/L / GGT: x           PT/INR - ( 14 Sep 2023 05:24 )   PT: 12.30 sec;   INR: 1.08 ratio         PTT - ( 14 Sep 2023 05:24 )  PTT:43.2 sec  Troponin T, Serum: <0.01 ng/mL (09-13 @ 21:40)    CARDIAC MARKERS ( 13 Sep 2023 21:40 )  x     / <0.01 ng/mL / x     / x     / x            Urinalysis Basic - ( 14 Sep 2023 05:24 )    Color: x / Appearance: x / SG: x / pH: x  Gluc: 121 mg/dL / Ketone: x  / Bili: x / Urobili: x   Blood: x / Protein: x / Nitrite: x   Leuk Esterase: x / RBC: x / WBC x   Sq Epi: x / Non Sq Epi: x / Bacteria: x        Cultures:         CHIEF COMPLAINT:  Patient is a 83y old  Female who presents with a chief complaint of chest pain (13 Sep 2023 23:43).    Diagnosis of STEMI.  She presented on 9/13 at the [facility] ED, received Brilinta load, heparin, morphine and nitroglycerin SL in the ED. Code STEMI called. Patient transferred to [facility] for emergent cath. Admitted to CCU.    Today is hospital day 1. She states that her chest pain has resolved and denies any other or new issues. She is laying comfortably in bed.      INTERVAL HISTORY/OVERNIGHT EVENTS:      ======================  MEDICATIONS:  aspirin enteric coated 81 milliGRAM(s) Oral daily  atorvastatin 80 milliGRAM(s) Oral at bedtime  chlorhexidine 2% Cloths 1 Application(s) Topical <User Schedule>  enoxaparin Injectable 40 milliGRAM(s) SubCutaneous every 24 hours  influenza  Vaccine (HIGH DOSE) 0.7 milliLiter(s) IntraMuscular once  levothyroxine 75 MICROGram(s) Oral daily  metoprolol tartrate 12.5 milliGRAM(s) Oral every 12 hours  ondansetron    Tablet 4 milliGRAM(s) Oral once  senna 1 Tablet(s) Oral once  ticagrelor 90 milliGRAM(s) Oral every 12 hours    DRIPS:    PRN:       ======================  PHYSICAL EXAMINATION:  GEN:  nad.   HEENT: No JVD  PULM:  b/l lung sounds clear, no wheeze  CARD: s1, s2 heard, no murmur  ABD: +bs. ntnd  EXT:  Patient has purple bruises on her extremities  NEURO:  no new focal deficits.   ======================  OBJECTIVE:        VS:  T(F): 97.2 (09-14 @ 00:00), Max: 98.5 (09-13 @ 21:36)  HR: 67 (09-14 @ 06:00) (64 - 113)  BP: 160/70 (09-14 @ 06:00) (121/60 - 200/95)  RR: 17 (09-14 @ 06:00) (14 - 24)  SpO2: 97% (09-14 @ 06:00) (97% - 100%)  CVP(mm Hg): --  CO: --  CI: --  PA: --  PCWP: --    I/O:      09-13 @ 07:01  -  09-14 @ 07:00  --------------------------------------------------------  IN: 1000 mL / OUT: 1050 mL / NET: -50 mL        Weight trend:  Weight (kg): 82.8 (09-14)    ======================    LABS:                          13.8   12.65 )-----------( 240      ( 14 Sep 2023 05:24 )             43.1     09-14    135  |  98  |  14  ----------------------------<  121<H>  4.3   |  23  |  0.7    Ca    9.4      14 Sep 2023 05:24  Mg     2.2     09-14    TPro  7.1  /  Alb  4.2  /  TBili  0.4  /  DBili  x   /  AST  49<H>  /  ALT  13  /  AlkPhos  82  09-14    LIVER FUNCTIONS - ( 14 Sep 2023 05:24 )  Alb: 4.2 g/dL / Pro: 7.1 g/dL / ALK PHOS: 82 U/L / ALT: 13 U/L / AST: 49 U/L / GGT: x           PT/INR - ( 14 Sep 2023 05:24 )   PT: 12.30 sec;   INR: 1.08 ratio         PTT - ( 14 Sep 2023 05:24 )  PTT:43.2 sec  Troponin T, Serum: <0.01 ng/mL (09-13 @ 21:40)    CARDIAC MARKERS ( 13 Sep 2023 21:40 )  x     / <0.01 ng/mL / x     / x     / x            Cultures:     CHIEF COMPLAINT:  Patient is a 83y old  Female who presents with a chief complaint of chest pain (13 Sep 2023 23:43).    Diagnosis of STEMI.  She presented on 9/13 at the [facility] ED, received Brilinta load, heparin, morphine and nitroglycerin SL in the ED. Code STEMI called. Patient transferred to [facility] for emergent cath. Admitted to CCU.    Today is hospital day 1. She states that her chest pain has resolved and denies any other or new issues. She is laying comfortably in bed.      INTERVAL HISTORY/OVERNIGHT EVENTS:  AIVR overnight. Hypertensive.    ======================  MEDICATIONS:  aspirin enteric coated 81 milliGRAM(s) Oral daily  atorvastatin 80 milliGRAM(s) Oral at bedtime  chlorhexidine 2% Cloths 1 Application(s) Topical <User Schedule>  enoxaparin Injectable 40 milliGRAM(s) SubCutaneous every 24 hours  influenza  Vaccine (HIGH DOSE) 0.7 milliLiter(s) IntraMuscular once  levothyroxine 75 MICROGram(s) Oral daily  metoprolol tartrate 12.5 milliGRAM(s) Oral every 12 hours  ondansetron    Tablet 4 milliGRAM(s) Oral once  senna 1 Tablet(s) Oral once  ticagrelor 90 milliGRAM(s) Oral every 12 hours    ======================  PHYSICAL EXAMINATION:  GEN:  nad.   HEENT: No JVD  PULM:  b/l lung sounds clear, no wheeze  CARD: s1, s2 heard, no murmur  ABD: +bs. ntnd  EXT:  Patient has purple bruises on her extremities  NEURO:  no new focal deficits.   ======================  OBJECTIVE:        VS:  T(F): 97.2 (09-14 @ 00:00), Max: 98.5 (09-13 @ 21:36)  HR: 67 (09-14 @ 06:00) (64 - 113)  BP: 160/70 (09-14 @ 06:00) (121/60 - 200/95)  RR: 17 (09-14 @ 06:00) (14 - 24)  SpO2: 97% (09-14 @ 06:00) (97% - 100%)  CVP(mm Hg): --  CO: --  CI: --  PA: --  PCWP: --    I/O:      09-13 @ 07:01  -  09-14 @ 07:00  --------------------------------------------------------  IN: 1000 mL / OUT: 1050 mL / NET: -50 mL        Weight trend:  Weight (kg): 82.8 (09-14)    ======================    LABS:                          13.8   12.65 )-----------( 240      ( 14 Sep 2023 05:24 )             43.1     09-14    135  |  98  |  14  ----------------------------<  121<H>  4.3   |  23  |  0.7    Ca    9.4      14 Sep 2023 05:24  Mg     2.2     09-14    TPro  7.1  /  Alb  4.2  /  TBili  0.4  /  DBili  x   /  AST  49<H>  /  ALT  13  /  AlkPhos  82  09-14    LIVER FUNCTIONS - ( 14 Sep 2023 05:24 )  Alb: 4.2 g/dL / Pro: 7.1 g/dL / ALK PHOS: 82 U/L / ALT: 13 U/L / AST: 49 U/L / GGT: x           PT/INR - ( 14 Sep 2023 05:24 )   PT: 12.30 sec;   INR: 1.08 ratio         PTT - ( 14 Sep 2023 05:24 )  PTT:43.2 sec  Troponin T, Serum: <0.01 ng/mL (09-13 @ 21:40)    CARDIAC MARKERS ( 13 Sep 2023 21:40 )  x     / <0.01 ng/mL / x     / x     / x            Cultures:     CHIEF COMPLAINT:  Patient is a 83y old  Female who presents with a chief complaint of chest pain (13 Sep 2023 23:43).    Diagnosis of STEMI.  She presented on 9/13 at the [facility] ED, received Brilinta load, heparin, morphine and nitroglycerin SL in the ED. Code STEMI called. Patient transferred to [facility] for emergent cath. Admitted to CCU.    Today is hospital day 1. She states that her chest pain has resolved and denies any other or new issues. She is laying comfortably in bed.      INTERVAL HISTORY/OVERNIGHT EVENTS:  AIVR overnight. Hypertensive.    ======================  MEDICATIONS:  aspirin enteric coated 81 milliGRAM(s) Oral daily  atorvastatin 80 milliGRAM(s) Oral at bedtime  chlorhexidine 2% Cloths 1 Application(s) Topical <User Schedule>  enoxaparin Injectable 40 milliGRAM(s) SubCutaneous every 24 hours  influenza  Vaccine (HIGH DOSE) 0.7 milliLiter(s) IntraMuscular once  levothyroxine 75 MICROGram(s) Oral daily  metoprolol tartrate 12.5 milliGRAM(s) Oral every 12 hours  ondansetron    Tablet 4 milliGRAM(s) Oral once  senna 1 Tablet(s) Oral once  ticagrelor 90 milliGRAM(s) Oral every 12 hours    ======================  PHYSICAL EXAMINATION:  GEN:  nad.   HEENT: No JVD  PULM:  b/l lung sounds clear, no wheeze  CARD: s1, s2 heard, no murmur  ABD: +bs. ntnd  EXT:  Patient has purple bruises on her extremities  NEURO:  no new focal deficits.   ======================  OBJECTIVE:        VS:  T(F): 97.2 (09-14 @ 00:00), Max: 98.5 (09-13 @ 21:36)  HR: 67 (09-14 @ 06:00) (64 - 113)  BP: 160/70 (09-14 @ 06:00) (121/60 - 200/95)  RR: 17 (09-14 @ 06:00) (14 - 24)  SpO2: 97% (09-14 @ 06:00) (97% - 100%)  CVP(mm Hg): --  CO: --  CI: --  PA: --  PCWP: --    I/O:      09-13 @ 07:01  -  09-14 @ 07:00  --------------------------------------------------------  IN: 1000 mL / OUT: 1050 mL / NET: -50 mL        Weight trend:  Weight (kg): 82.8 (09-14)    ======================    LABS:                          13.8   12.65 )-----------( 240      ( 14 Sep 2023 05:24 )             43.1     09-14    135  |  98  |  14  ----------------------------<  121<H>  4.3   |  23  |  0.7    Ca    9.4      14 Sep 2023 05:24  Mg     2.2     09-14    TPro  7.1  /  Alb  4.2  /  TBili  0.4  /  DBili  x   /  AST  49<H>  /  ALT  13  /  AlkPhos  82  09-14    LIVER FUNCTIONS - ( 14 Sep 2023 05:24 )  Alb: 4.2 g/dL / Pro: 7.1 g/dL / ALK PHOS: 82 U/L / ALT: 13 U/L / AST: 49 U/L / GGT: x           PT/INR - ( 14 Sep 2023 05:24 )   PT: 12.30 sec;   INR: 1.08 ratio         PTT - ( 14 Sep 2023 05:24 )  PTT:43.2 sec  Troponin T, Serum: <0.01 ng/mL (09-13 @ 21:40)    CARDIAC MARKERS ( 13 Sep 2023 21:40 )  x     / <0.01 ng/mL / x     / x     / x

## 2023-09-14 NOTE — PATIENT PROFILE ADULT - FALL HARM RISK - HARM RISK INTERVENTIONS
Assistance with ambulation/Assistance OOB with selected safe patient handling equipment/Communicate Risk of Fall with Harm to all staff/Discuss with provider need for PT consult/Monitor gait and stability/Provide patient with walking aids - walker, cane, crutches/Reinforce activity limits and safety measures with patient and family/Sit up slowly, dangle for a short time, stand at bedside before walking/Tailored Fall Risk Interventions/Use of alarms - bed, chair and/or voice tab/Visual Cue: Yellow wristband and red socks/Bed in lowest position, wheels locked, appropriate side rails in place/Call bell, personal items and telephone in reach/Instruct patient to call for assistance before getting out of bed or chair/Non-slip footwear when patient is out of bed/[place] to call system/Physically safe environment - no spills, clutter or unnecessary equipment/Purposeful Proactive Rounding/Room/bathroom lighting operational, light cord in reach

## 2023-09-14 NOTE — PROGRESS NOTE ADULT - ASSESSMENT
IMPRESSION:    PLAN:    CNS:       HEENT:       PULMONARY:        CARDIOVASCULAR:       GI:       RENAL:        INFECTIOUS DISEASE:       HEMATOLOGICAL:        ENDOCRINE:        MUSCULOSKELETAL:       CCU monitoring   IMPRESSION:  83y F pmh hypothyroidism, back pain and peripheral neuropathy presenting with non-exertional chest pain admitted for STEMI.     #ACS  #STEMI s/p PCI, VIET x2 to OM1  #Severe 2-vessel disease OM1 and RCA  #Hx Hypothyroidism     PLAN:    CNS: Avoid CNS Depressants.    HEENT: Oral care. Aspiration precautions     PULMONARY: HOB @ 45. Monitor Pulse Ox. Keep > 93%. Supplement as needed.    CARDIOVASCULAR:   - s/p PCI w/ VIET to OM1x2  - c/w asa 81qd, brilinta 90mg bid, lipitor 80mg qhs  - increase lopressor to 25 BID  - start losartan 25mg qd  - TTE   - plan for staged PCI to RCA prior to d/c  - GORGE score 4 - 7.3% risk of all-cause mortality at 30 days.    GI: GI prophylaxis. DASH Diet.     RENAL: Avoid nephrotoxic agents. Cr stable.     INFECTIOUS DISEASE: monitor off abx.     HEMATOLOGICAL: DVT Lovenox 40qD    ENDOCRINE: Monitor FS. C/w levothyroxine. TSH, lipid panel,  A1c.    MUSCULOSKELETAL: Ambulate as tolerated. Remove R Radial Hemostat.    CODE STATUS: Full code    DISPOSITION: DG to 4T when bed available     IMPRESSION:  83y F pmh hypothyroidism, back pain and peripheral neuropathy presenting with non-exertional chest pain admitted for STEMI.     #ACS  #STEMI s/p PCI, VIET x2 to OM1  #Severe 2-vessel disease OM1 and RCA  #Hx Hypothyroidism     PLAN:    CNS: Avoid CNS Depressants.    HEENT: Oral care. Aspiration precautions     PULMONARY: HOB @ 45. Monitor Pulse Ox. Keep > 93%. Supplement as needed.    CARDIOVASCULAR:   - s/p PCI w/ VIET to OM1x2  - c/w asa 81qd, brilinta 90mg bid, lipitor 80mg qhs  - increase lopressor to 25 BID  - start losartan 25mg qd  - TTE   - plan for staged PCI to RCA prior to d/c  - GORGE score 4 - 7.3% risk of all-cause mortality at 30 days.    GI: GI prophylaxis. DASH Diet.     RENAL: Avoid nephrotoxic agents. Cr stable.     INFECTIOUS DISEASE: monitor off abx.     HEMATOLOGICAL: DVT Lovenox 40qD    ENDOCRINE: Monitor FS. C/w levothyroxine. TSH, lipid panel,  A1c.    MUSCULOSKELETAL: Ambulate as tolerated. Remove R Radial Hemostat.    CODE STATUS: Full code    DISPOSITION: DG to 4T when bed available

## 2023-09-14 NOTE — PROGRESS NOTE ADULT - SUBJECTIVE AND OBJECTIVE BOX
Cardiology Follow up s/p PCI VIET    HELIO STEPHENS   83y Female  PAST MEDICAL & SURGICAL HISTORY:    Hypothyroid      Lumbar radicular pain      S/P cholecystectomy           HPI:  83y F pmh hypothyroidism, back pain and peripheral neuropathy presenting with chest pain. Patient states she started having midsternal/epigastric chest pain for ~1hr that started after eating. Pain described as non-radiating, non-exertional burning chest pain ?/10 that did not resolve with rest. Pt explained she felt like food was stuck in her throat. Patient took baking soda and ASA 325mg without relief prompting her to come to the ED. She denies any associated sxs of nausea, vomiting, shortness of breath, palpitations, diaphoresis, dizziness, confusion or lightheadedness.     Family states she does not have any prior cardiac history. Her  explains she once saw a Cardiologist in [place] who said she had evidence of a "leaky heart valve" but did not have any follow up. She is a prior smoker quit 35-40yrs ago. No alcohol or recreational drug use, patient only takes levothyroxine 75mg and tramadol for back/radicular pain and multivitamins. Denies any prior hx of dyspnea on exertion or exertional chest pain prior to today.     EKG: ST elevations lateral leads, concern for acute lateral wall ischemia     Patient received Brilinta load, heparin, morphine and nitroglycerin SL in the ED. Code STEMI called. Patient transferred to [facility] for emergent cath. Admitted to CCU.  (13 Sep 2023 23:43)    Allergies    No Known Allergies    Intolerances    Patient seen and examined at bedside. No acute events overnight.  Patient without complaints. Denies CP, SOB, palpitations, or dizziness  No events on telemetry overnight    Vital Signs Last 24 Hrs  T(C): 36.9 (14 Sep 2023 12:00), Max: 36.9 (13 Sep 2023 21:36)  T(F): 98.4 (14 Sep 2023 12:00), Max: 98.5 (13 Sep 2023 21:36)  HR: 70 (14 Sep 2023 12:00) (63 - 113)  BP: 131/60 (14 Sep 2023 12:00) (120/74 - 200/95)  BP(mean): 87 (14 Sep 2023 12:00) (80 - 108)  RR: 22 (14 Sep 2023 12:00) (14 - 24)  SpO2: 98% (14 Sep 2023 12:00) (93% - 100%)    Parameters below as of 14 Sep 2023 12:00  Patient On (Oxygen Delivery Method): room air    MEDICATIONS  (STANDING):  aspirin enteric coated 81 milliGRAM(s) Oral daily  atorvastatin 80 milliGRAM(s) Oral at bedtime  chlorhexidine 2% Cloths 1 Application(s) Topical <User Schedule>  enoxaparin Injectable 40 milliGRAM(s) SubCutaneous every 24 hours  influenza  Vaccine (HIGH DOSE) 0.7 milliLiter(s) IntraMuscular once  levothyroxine 75 MICROGram(s) Oral daily  losartan 25 milliGRAM(s) Oral daily  metoprolol tartrate 25 milliGRAM(s) Oral two times a day  ondansetron    Tablet 4 milliGRAM(s) Oral once  senna 1 Tablet(s) Oral once  ticagrelor 90 milliGRAM(s) Oral every 12 hours    MEDICATIONS  (PRN):  acetaminophen     Tablet .. 650 milliGRAM(s) Oral every 6 hours PRN Temp greater or equal to 38C (100.4F), Mild Pain (1 - 3)      REVIEW OF SYSTEMS:          All negative except as mentioned in HPI    PHYSICAL EXAM:           CONSTITUTIONAL: Well-developed; well-nourished; in no acute distress  	SKIN: warm, dry  	HEAD: Normocephalic; atraumatic  	EYES: PERRL.  	ENT: No nasal discharge, airway clear, mucous membranes moist  	NECK: Supple; non tender.  	CARD: +S1, +S2, no murmurs, gallops, or rubs. Regular rate and rhythm    	RESP: No wheezes, rales or rhonchi. CTA B/L  	ABD: soft ntnd, + BS x 4 quadrants  	EXT: moves all extremities,  no clubbing, cyanosis or edema  	NEURO: Alert and oriented x3, no focal deficits          PSYCH: Cooperative, appropriate          VASCULAR:  + Rad / + PTs / +  DPs          EXTREMITY:              Right Radial: Dressing D/C/I, access site soft, no hematoma, no pain, + pulses, no sign of infection, no numbness            ECG:   < from: 12 Lead ECG (09.14.23 @ 05:39) >  Ventricular Rate 73 BPM    Atrial Rate 73 BPM    P-R Interval 176 ms    QRS Duration 68 ms    Q-T Interval 402 ms    QTC Calculation(Bazett) 442 ms    P Axis 49 degrees    R Axis 5 degrees    T Axis 76 degrees    Diagnosis Line Normal sinus rhythm  T wave abnormality, consider lateral ischemia  Abnormal ECG    Confirmed by José Manuel Ge ([id number]) on 9/14/2023 7:10:39 AM                                                                                         2D ECHO:  < from: OBSERVATION (TTE Echo Complete w/ Contrast w/ Doppler) (09.14.23 @ 11:39) >  CardExmStatus_ExamStatus Exam Completed  Pending Reading    LABS:                        13.8   12.65 )-----------( 240      ( 14 Sep 2023 05:24 )             43.1     09-14    135  |  98  |  14  ----------------------------<  121<H>  4.3   |  23  |  0.7    Ca    9.4      14 Sep 2023 05:24  Mg     2.2     09-14    TPro  7.1  /  Alb  4.2  /  TBili  0.4  /  DBili  x   /  AST  49<H>  /  ALT  13  /  AlkPhos  82  09-14    CARDIAC MARKERS ( 13 Sep 2023 21:40 )  x     / <0.01 ng/mL / x     / x     / x        Magnesium: 2.2 mg/dL [1.8 - 2.4] (09-14-23 @ 05:24)  LIVER FUNCTIONS - ( 14 Sep 2023 05:24 )  Alb: 4.2 g/dL / Pro: 7.1 g/dL / ALK PHOS: 82 U/L / ALT: 13 U/L / AST: 49 U/L / GGT: x             A/P:  I discussed the case with Cardiologist Dr. Womack and recommend the following:  S/P PCI:  Access & Closure:   [x ]  6 Fr R   Radial Artery  -> D-stat       IV Contrast: 150 mL      Intervention:  Successful PCI of OM1 with balloon angioplasty s/p VIET x 2    Implants:  [place] 2.5 x 15 mm, [place] 2.75 x 22 mm to OM1                  AUC: 9     FINDINGS:   Coronary Dominance: Right  LM: Mild disease diffusely  LAD: Mild disease diffusely  D1: Diffusely disease  CX: Proximal segment 70% stenosis, distal segment 50% stenosis  OM1: Two segments with focal stenosis - 90% and 95%  OM2: Diffuse mild disease  RCA: Mid segment 90% stenosis, distal segment mild disease  RPDA: severe disease, small    LVEDP:  16mmHg                         Care as per CCU team                    f/u 2D Echo results                    OOB to chair with assistance                    Keep K = 4, Mg = 2                   Start Protonix 40 mg PO Daily                    Continue DAPT ( Aspirin 81 mg PO Daily and Brilinta 90 mg PO q12hr ), ARB, B-Blocker, Statin Therapy                   Patient pharmacy called in for Brilinta prescription and it is $30 per month, patient is agreeing for it, medication is ready for a                     Patient agreeing to take DAPT for at least one year or as directed by cardiologist                    Pt given instructions on importance of taking antiplatelet medication or risk acute stent thrombosis/death                   Post cath instructions, access site care and activity restrictions reviewed with patient                     Discussed with patient to return to hospital if experience chest pain, shortness breath, dizziness and site bleeding                   Aggressive risk factor modification, diet counseling, smoking cessation discussed with patient                       Benefits of Cardiac Rehab discussed with patient, All documents sent to [facility]. Patient instructed to call and make first                               appointment after first f/u visit with Cardiologist                    Monitor in CCU

## 2023-09-15 LAB
ALBUMIN SERPL ELPH-MCNC: 3.9 G/DL — SIGNIFICANT CHANGE UP (ref 3.5–5.2)
ALP SERPL-CCNC: 69 U/L — SIGNIFICANT CHANGE UP (ref 30–115)
ALT FLD-CCNC: 12 U/L — SIGNIFICANT CHANGE UP (ref 0–41)
ANION GAP SERPL CALC-SCNC: 9 MMOL/L — SIGNIFICANT CHANGE UP (ref 7–14)
AST SERPL-CCNC: 36 U/L — SIGNIFICANT CHANGE UP (ref 0–41)
BILIRUB SERPL-MCNC: 0.5 MG/DL — SIGNIFICANT CHANGE UP (ref 0.2–1.2)
BUN SERPL-MCNC: 12 MG/DL — SIGNIFICANT CHANGE UP (ref 10–20)
CALCIUM SERPL-MCNC: 9.1 MG/DL — SIGNIFICANT CHANGE UP (ref 8.4–10.5)
CHLORIDE SERPL-SCNC: 107 MMOL/L — SIGNIFICANT CHANGE UP (ref 98–110)
CO2 SERPL-SCNC: 25 MMOL/L — SIGNIFICANT CHANGE UP (ref 17–32)
CREAT SERPL-MCNC: 0.7 MG/DL — SIGNIFICANT CHANGE UP (ref 0.7–1.5)
EGFR: 85 ML/MIN/1.73M2 — SIGNIFICANT CHANGE UP
GLUCOSE SERPL-MCNC: 106 MG/DL — HIGH (ref 70–99)
HCT VFR BLD CALC: 41.7 % — SIGNIFICANT CHANGE UP (ref 37–47)
HGB BLD-MCNC: 13.4 G/DL — SIGNIFICANT CHANGE UP (ref 12–16)
MAGNESIUM SERPL-MCNC: 2.2 MG/DL — SIGNIFICANT CHANGE UP (ref 1.8–2.4)
MCHC RBC-ENTMCNC: 28.5 PG — SIGNIFICANT CHANGE UP (ref 27–31)
MCHC RBC-ENTMCNC: 32.1 G/DL — SIGNIFICANT CHANGE UP (ref 32–37)
MCV RBC AUTO: 88.5 FL — SIGNIFICANT CHANGE UP (ref 81–99)
NRBC # BLD: 0 /100 WBCS — SIGNIFICANT CHANGE UP (ref 0–0)
PHOSPHATE SERPL-MCNC: 3.6 MG/DL — SIGNIFICANT CHANGE UP (ref 2.1–4.9)
PLATELET # BLD AUTO: 210 K/UL — SIGNIFICANT CHANGE UP (ref 130–400)
PMV BLD: 10.4 FL — SIGNIFICANT CHANGE UP (ref 7.4–10.4)
POTASSIUM SERPL-MCNC: 3.9 MMOL/L — SIGNIFICANT CHANGE UP (ref 3.5–5)
POTASSIUM SERPL-SCNC: 3.9 MMOL/L — SIGNIFICANT CHANGE UP (ref 3.5–5)
PROT SERPL-MCNC: 6 G/DL — SIGNIFICANT CHANGE UP (ref 6–8)
RBC # BLD: 4.71 M/UL — SIGNIFICANT CHANGE UP (ref 4.2–5.4)
RBC # FLD: 14 % — SIGNIFICANT CHANGE UP (ref 11.5–14.5)
SODIUM SERPL-SCNC: 141 MMOL/L — SIGNIFICANT CHANGE UP (ref 135–146)
TROPONIN T SERPL-MCNC: 0.61 NG/ML — CRITICAL HIGH
TROPONIN T SERPL-MCNC: 0.61 NG/ML — CRITICAL HIGH
WBC # BLD: 11.46 K/UL — HIGH (ref 4.8–10.8)
WBC # FLD AUTO: 11.46 K/UL — HIGH (ref 4.8–10.8)

## 2023-09-15 PROCEDURE — 99232 SBSQ HOSP IP/OBS MODERATE 35: CPT

## 2023-09-15 PROCEDURE — 93010 ELECTROCARDIOGRAM REPORT: CPT

## 2023-09-15 PROCEDURE — 71045 X-RAY EXAM CHEST 1 VIEW: CPT | Mod: 26

## 2023-09-15 RX ORDER — SENNA PLUS 8.6 MG/1
2 TABLET ORAL AT BEDTIME
Refills: 0 | Status: DISCONTINUED | OUTPATIENT
Start: 2023-09-15 | End: 2023-09-19

## 2023-09-15 RX ORDER — POLYETHYLENE GLYCOL 3350 17 G/17G
17 POWDER, FOR SOLUTION ORAL
Refills: 0 | Status: DISCONTINUED | OUTPATIENT
Start: 2023-09-15 | End: 2023-09-19

## 2023-09-15 RX ADMIN — ENOXAPARIN SODIUM 40 MILLIGRAM(S): 100 INJECTION SUBCUTANEOUS at 06:06

## 2023-09-15 RX ADMIN — Medication 25 MILLIGRAM(S): at 06:06

## 2023-09-15 RX ADMIN — LOSARTAN POTASSIUM 25 MILLIGRAM(S): 100 TABLET, FILM COATED ORAL at 06:06

## 2023-09-15 RX ADMIN — CHLORHEXIDINE GLUCONATE 1 APPLICATION(S): 213 SOLUTION TOPICAL at 06:07

## 2023-09-15 RX ADMIN — SENNA PLUS 2 TABLET(S): 8.6 TABLET ORAL at 21:04

## 2023-09-15 RX ADMIN — Medication 75 MICROGRAM(S): at 06:06

## 2023-09-15 RX ADMIN — Medication 25 MILLIGRAM(S): at 17:13

## 2023-09-15 RX ADMIN — TICAGRELOR 90 MILLIGRAM(S): 90 TABLET ORAL at 17:12

## 2023-09-15 RX ADMIN — TICAGRELOR 90 MILLIGRAM(S): 90 TABLET ORAL at 06:06

## 2023-09-15 RX ADMIN — ATORVASTATIN CALCIUM 80 MILLIGRAM(S): 80 TABLET, FILM COATED ORAL at 21:04

## 2023-09-15 RX ADMIN — POLYETHYLENE GLYCOL 3350 17 GRAM(S): 17 POWDER, FOR SOLUTION ORAL at 17:13

## 2023-09-15 RX ADMIN — Medication 81 MILLIGRAM(S): at 11:54

## 2023-09-15 NOTE — PROGRESS NOTE ADULT - ATTENDING COMMENTS
Doing well. Transfer to cardiac telemetry once bed available.  Continue GDMT as specified above.  No further runs of AIVR noted.  Plan for staged PCI on Monday.    (Date of Service: 09/16/2023)
Agree with above.  Plan for staged PCI on Monday.  Continue DAPT/BB/statin/ARB.

## 2023-09-15 NOTE — PHYSICAL THERAPY INITIAL EVALUATION ADULT - ADDITIONAL COMMENTS
Pt. lives in a private multilevel home with stairs. Reports use of SC for outdoor ambulation PRN. Independent at baseline.

## 2023-09-15 NOTE — PHYSICAL THERAPY INITIAL EVALUATION ADULT - GENERAL OBSERVATIONS, REHAB EVAL
Pt. encountered sitting in b/s chair in NAD. +Tele, +Pulse ox, +EKG. Agreeable to PT. Dr. Kumar aware.

## 2023-09-15 NOTE — DIETITIAN INITIAL EVALUATION ADULT - OTHER INFO
Pertinent Medical Information: Per H&P, pt is an 84 y/o female w/ PMHx of hypothyroidism, back pain and peripheral neuropathy presenting with chest pain. Patient states she started having midsternal/epigastric chest pain for ~1hr that started after eating. Pain described as non-radiating, non-exertional burning chest pain ?/10 that did not resolve with rest. Pt explained she felt like food was stuck in her throat. Patient took baking soda and ASA 325mg without relief prompting her to come to the ED. She denies any associated sxs of nausea, vomiting, shortness of breath, palpitations, diaphoresis, dizziness, confusion or lightheadedness.     Pertinent Subjective Information: Per RN, pt had poor appetite prior; appetite improved today - consumed >75% of meal. Tolerating diet texture/consistency well. No nausea or vomiting reported.     Weight hx: UBW unknown. Current dosing weight is 82.8 KG. No previous admission weights in EMR. Unable to determine if pt meets criteria for malnutrition at this time. Will attempt to obtain UBW at follow up.

## 2023-09-15 NOTE — PROGRESS NOTE ADULT - ASSESSMENT
IMPRESSION:  83y F pmh hypothyroidism, back pain and peripheral neuropathy presenting with non-exertional chest pain admitted for STEMI.     #ACS  #STEMI s/p PCI, VIET x2 to OM1  #Severe 2-vessel disease OM1 and RCA  #Hx Hypothyroidism     PLAN:    CNS: Avoid CNS Depressants.    HEENT: Oral care. Aspiration precautions     PULMONARY: HOB @ 45. Monitor Pulse Ox. Keep > 93%. Supplement as needed.    CARDIOVASCULAR:   - s/p PCI w/ VIET to OM1x2  - c/w asa 81qd, brilinta 90mg bid, lipitor 80mg qhs  - increase lopressor to 25 BID  - start losartan 25mg qd  - TTE   - plan for staged PCI to RCA prior to d/c  - GORGE score 4 - 7.3% risk of all-cause mortality at 30 days.    GI: GI prophylaxis. DASH Diet.     RENAL: Avoid nephrotoxic agents. Cr stable.     INFECTIOUS DISEASE: monitor off abx.     HEMATOLOGICAL: DVT Lovenox 40qD    ENDOCRINE: Monitor FS. C/w levothyroxine. TSH, lipid panel,  A1c.    MUSCULOSKELETAL: Ambulate as tolerated. Remove R Radial Hemostat.    CODE STATUS: Full code    DISPOSITION: DG to 4T when bed available IMPRESSION:  83y F pmh hypothyroidism, back pain and peripheral neuropathy presenting with non-exertional chest pain admitted for STEMI.     #ACS  #STEMI s/p PCI, VIET x2 to OM1  #Severe 2-vessel disease OM1 and RCA  #Hx Hypothyroidism (TSH 2.2 on 9/14/23)    PLAN:    CNS: Avoid CNS Depressants.    HEENT: Oral care. Aspiration precautions     PULMONARY: HOB @ 45. Monitor Pulse Ox. Keep > 93%. Supplement as needed.    CARDIOVASCULAR:   - s/p PCI w/ VIET to OM1x2  - c/w asa 81qd, brilinta 90mg bid, lipitor 80mg qhs  - continue lopressor 25 BID  - continue losartan 25mg qd  - TTE 9/14 EF 50-55%, G1DD  - plan for staged PCI to RCA prior to d/c  - GORGE score 4 - 7.3% risk of all-cause mortality at 30 days.    GI: GI prophylaxis. DASH Diet. Added laxatives on 9/15.    RENAL: Avoid nephrotoxic agents. Cr stable. UA noted but patient is asymptomatic (no dysuria).    INFECTIOUS DISEASE: monitor off abx.     HEMATOLOGICAL: DVT Lovenox 40qD    ENDOCRINE: Monitor FS. C/w levothyroxine. TSH 2.2, lipid panel significant for LDL-C 108,  A1c 5.9%.    MUSCULOSKELETAL: Ambulate as tolerated. Patient noted to be walking with PT on 9/15/23. Remove R Radial Hemostat.    CODE STATUS: Full code    DISPOSITION: DG to 4T when bed available

## 2023-09-15 NOTE — DIETITIAN INITIAL EVALUATION ADULT - ADD RECOMMEND
1. ADD Ensure Plus HP 2X/DAILY to optimize kcal/pro intake -- provides 700 kcal/day total, 40g pro/day total  2. Continue with current diet order   3. Encourage PO intake and assist during meals prn    Pt is at high nutrition risk; will f/u in 3-5 days or prn.

## 2023-09-15 NOTE — PROGRESS NOTE ADULT - SUBJECTIVE AND OBJECTIVE BOX
CHIEF COMPLAINT:  Patient is a 83y old  Female who presents with a chief complaint of STEMI (14 Sep 2023 14:26)      INTERVAL HISTORY/OVERNIGHT EVENTS:      ======================  MEDICATIONS:  aspirin enteric coated 81 milliGRAM(s) Oral daily  atorvastatin 80 milliGRAM(s) Oral at bedtime  chlorhexidine 2% Cloths 1 Application(s) Topical <User Schedule>  enoxaparin Injectable 40 milliGRAM(s) SubCutaneous every 24 hours  influenza  Vaccine (HIGH DOSE) 0.7 milliLiter(s) IntraMuscular once  levothyroxine 75 MICROGram(s) Oral daily  losartan 25 milliGRAM(s) Oral daily  metoprolol tartrate 25 milliGRAM(s) Oral two times a day  ondansetron    Tablet 4 milliGRAM(s) Oral once  polyethylene glycol 3350 17 Gram(s) Oral two times a day  senna 2 Tablet(s) Oral at bedtime  ticagrelor 90 milliGRAM(s) Oral every 12 hours    DRIPS:    PRN:       ======================  PHYSICAL EXAMINATION:  GEN:  nad.   HEENT:  eomi. ncat  PULM:  b/l lung sounds   CARD: s1, s2  ABD: +bs. ntnd  EXT:  no new rashes.    NEURO:  no new focal deficits.   ======================  OBJECTIVE:        VS:  T(F): 97.6 (09-15 @ 08:00), Max: 98.9 (09-15 @ 04:00)  HR: 69 (09-15 @ 08:00) (60 - 87)  BP: 115/59 (09-15 @ 08:00) (78/43 - 154/73)  RR: 25 (09-15 @ 08:00) (16 - 41)  SpO2: 97% (09-15 @ 08:00) (93% - 100%)  CVP(mm Hg): --  CO: --  CI: --  PA: --  PCWP: --    I/O:      09-13 @ 07:01  -  09-14 @ 07:00  --------------------------------------------------------  IN: 1000 mL / OUT: 1050 mL / NET: -50 mL    09-14 @ 07:01  -  09-15 @ 07:00  --------------------------------------------------------  IN: 1080 mL / OUT: 1450 mL / NET: -370 mL        Weight trend:  Weight (kg): 82.8 (09-14)    ======================    LABS:                          13.4   11.46 )-----------( 210      ( 15 Sep 2023 04:37 )             41.7     09-15    141  |  107  |  12  ----------------------------<  106<H>  3.9   |  25  |  0.7    Ca    9.1      15 Sep 2023 04:37  Phos  3.6     09-15  Mg     2.2     09-15    TPro  6.0  /  Alb  3.9  /  TBili  0.5  /  DBili  x   /  AST  36  /  ALT  12  /  AlkPhos  69  09-15    LIVER FUNCTIONS - ( 15 Sep 2023 04:37 )  Alb: 3.9 g/dL / Pro: 6.0 g/dL / ALK PHOS: 69 U/L / ALT: 12 U/L / AST: 36 U/L / GGT: x           PT/INR - ( 14 Sep 2023 05:24 )   PT: 12.30 sec;   INR: 1.08 ratio         PTT - ( 14 Sep 2023 05:24 )  PTT:43.2 sec  Troponin T, Serum: 0.61 ng/mL (09-15 @ 04:37)  Troponin T, Serum: 0.61 ng/mL (09-15 @ 01:29)  Troponin T, Serum: 0.61 ng/mL (09-14 @ 15:39)    CARDIAC MARKERS ( 15 Sep 2023 04:37 )  x     / 0.61 ng/mL / x     / x     / x      CARDIAC MARKERS ( 15 Sep 2023 01:29 )  x     / 0.61 ng/mL / x     / x     / x      CARDIAC MARKERS ( 14 Sep 2023 15:39 )  x     / 0.61 ng/mL / x     / x     / x      CARDIAC MARKERS ( 13 Sep 2023 21:40 )  x     / <0.01 ng/mL / x     / x     / x        Urinalysis (09.14.23 @ 10:08)    pH Urine: 8.0   Glucose Qualitative, Urine: Negative mg/dL   Blood, Urine: Large   Color: Yellow   Urine Appearance: Cloudy   Bilirubin: Negative   Ketone - Urine: Negative mg/dL   Specific Gravity: >1.030   Protein, Urine: 100 mg/dL   Urobilinogen: 0.2 mg/dL   Nitrite: Positive   Leukocyte Esterase Concentration: Moderate      Cultures:         CHIEF COMPLAINT:  Patient is a 83y old  Female who presented with a chief complaint of chest pain (14 Sep 2023 14:26)    Today she was examined at bedside, noted to be comfortable. She denies any chest pain, SOB, or other new issues. She endorses not having had a BM since admission. Denies any abd pain, dysuria.      INTERVAL HISTORY/OVERNIGHT EVENTS:      ======================  MEDICATIONS:  aspirin enteric coated 81 milliGRAM(s) Oral daily  atorvastatin 80 milliGRAM(s) Oral at bedtime  chlorhexidine 2% Cloths 1 Application(s) Topical <User Schedule>  enoxaparin Injectable 40 milliGRAM(s) SubCutaneous every 24 hours  influenza  Vaccine (HIGH DOSE) 0.7 milliLiter(s) IntraMuscular once  levothyroxine 75 MICROGram(s) Oral daily  losartan 25 milliGRAM(s) Oral daily  metoprolol tartrate 25 milliGRAM(s) Oral two times a day  ondansetron    Tablet 4 milliGRAM(s) Oral once  polyethylene glycol 3350 17 Gram(s) Oral two times a day  senna 2 Tablet(s) Oral at bedtime  ticagrelor 90 milliGRAM(s) Oral every 12 hours    DRIPS:    PRN:       ======================  PHYSICAL EXAMINATION:  GEN:  nad.   HEENT: No JVD  PULM:  b/l lung sounds clear, no wheeze  CARD: s1, s2 heard, no murmur  ABD: +bs. ntnd  EXT:  Patient has purple bruises on her extremities  NEURO:  no new focal deficits.     ======================  OBJECTIVE:        VS:  T(F): 97.6 (09-15 @ 08:00), Max: 98.9 (09-15 @ 04:00)  HR: 69 (09-15 @ 08:00) (60 - 87)  BP: 115/59 (09-15 @ 08:00) (78/43 - 154/73)  RR: 25 (09-15 @ 08:00) (16 - 41)  SpO2: 97% (09-15 @ 08:00) (93% - 100%)  CVP(mm Hg): --  CO: --  CI: --  PA: --  PCWP: --    I/O:      09-13 @ 07:01  -  09-14 @ 07:00  --------------------------------------------------------  IN: 1000 mL / OUT: 1050 mL / NET: -50 mL    09-14 @ 07:01  -  09-15 @ 07:00  --------------------------------------------------------  IN: 1080 mL / OUT: 1450 mL / NET: -370 mL        Weight trend:  Weight (kg): 82.8 (09-14)    ======================    LABS:                          13.4   11.46 )-----------( 210      ( 15 Sep 2023 04:37 )             41.7     09-15    141  |  107  |  12  ----------------------------<  106<H>  3.9   |  25  |  0.7    Ca    9.1      15 Sep 2023 04:37  Phos  3.6     09-15  Mg     2.2     09-15    TPro  6.0  /  Alb  3.9  /  TBili  0.5  /  DBili  x   /  AST  36  /  ALT  12  /  AlkPhos  69  09-15    LIVER FUNCTIONS - ( 15 Sep 2023 04:37 )  Alb: 3.9 g/dL / Pro: 6.0 g/dL / ALK PHOS: 69 U/L / ALT: 12 U/L / AST: 36 U/L / GGT: x           PT/INR - ( 14 Sep 2023 05:24 )   PT: 12.30 sec;   INR: 1.08 ratio         PTT - ( 14 Sep 2023 05:24 )  PTT:43.2 sec  Troponin T, Serum: 0.61 ng/mL (09-15 @ 04:37)  Troponin T, Serum: 0.61 ng/mL (09-15 @ 01:29)  Troponin T, Serum: 0.61 ng/mL (09-14 @ 15:39)    CARDIAC MARKERS ( 15 Sep 2023 04:37 )  x     / 0.61 ng/mL / x     / x     / x      CARDIAC MARKERS ( 15 Sep 2023 01:29 )  x     / 0.61 ng/mL / x     / x     / x      CARDIAC MARKERS ( 14 Sep 2023 15:39 )  x     / 0.61 ng/mL / x     / x     / x      CARDIAC MARKERS ( 13 Sep 2023 21:40 )  x     / <0.01 ng/mL / x     / x     / x        Urinalysis (09.14.23 @ 10:08)    pH Urine: 8.0   Glucose Qualitative, Urine: Negative mg/dL   Blood, Urine: Large   Color: Yellow   Urine Appearance: Cloudy   Bilirubin: Negative   Ketone - Urine: Negative mg/dL   Specific Gravity: >1.030   Protein, Urine: 100 mg/dL   Urobilinogen: 0.2 mg/dL   Nitrite: Positive   Leukocyte Esterase Concentration: Moderate      Imaging:    < from: TTE Echo Complete w/ Contrast w/ Doppler (09.14.23 @ 11:01) >   1. Left ventricular ejection fraction, by visual estimation, is 50 to   55%.   2. Mildly decreased global left ventricular systolic function.   3. Moderate left ventricular hypertrophy.   4. Spectral Doppler shows impaired relaxation pattern of left   ventricular myocardial filling (Grade I diastolic dysfunction).   5. Normal left atrial size.   6. Normal right atrial size.   7. No evidence of mitral valve regurgitation.   8. Endocardial visualization was enhanced with intravenous echo contrast.

## 2023-09-15 NOTE — PHYSICAL THERAPY INITIAL EVALUATION ADULT - PERTINENT HX OF CURRENT PROBLEM, REHAB EVAL
83y F pmh hypothyroidism, back pain and peripheral neuropathy presenting with chest pain. Patient states she started having midsternal/epigastric chest pain for ~1hr that started after eating. Pain described as non-radiating, non-exertional burning chest pain ?/10 that did not resolve with rest. Pt explained she felt like food was stuck in her throat. Patient took baking soda and ASA 325mg without relief prompting her to come to the ED. She denies any associated sxs of nausea, vomiting, shortness of breath, palpitations, diaphoresis, dizziness, confusion or lightheadedness.     Family states she does not have any prior cardiac history. Her  explains she once saw a Cardiologist in [place] who said she had evidence of a "leaky heart valve" but did not have any follow up. She is a prior smoker quit 35-40yrs ago. No alcohol or recreational drug use, patient only takes levothyroxine 75mg and tramadol for back/radicular pain and multivitamins. Denies any prior hx of dyspnea on exertion or exertional chest pain prior to today.

## 2023-09-15 NOTE — DIETITIAN INITIAL EVALUATION ADULT - PERTINENT MEDS FT
MEDICATIONS  (STANDING):  aspirin enteric coated 81 milliGRAM(s) Oral daily  atorvastatin 80 milliGRAM(s) Oral at bedtime  chlorhexidine 2% Cloths 1 Application(s) Topical <User Schedule>  enoxaparin Injectable 40 milliGRAM(s) SubCutaneous every 24 hours  influenza  Vaccine (HIGH DOSE) 0.7 milliLiter(s) IntraMuscular once  levothyroxine 75 MICROGram(s) Oral daily  losartan 25 milliGRAM(s) Oral daily  metoprolol tartrate 25 milliGRAM(s) Oral two times a day  ondansetron    Tablet 4 milliGRAM(s) Oral once  polyethylene glycol 3350 17 Gram(s) Oral two times a day  senna 2 Tablet(s) Oral at bedtime  ticagrelor 90 milliGRAM(s) Oral every 12 hours    MEDICATIONS  (PRN):  acetaminophen     Tablet .. 650 milliGRAM(s) Oral every 6 hours PRN Temp greater or equal to 38C (100.4F), Mild Pain (1 - 3)

## 2023-09-15 NOTE — DIETITIAN INITIAL EVALUATION ADULT - PERTINENT LABORATORY DATA
09-15    141  |  107  |  12  ----------------------------<  106<H>  3.9   |  25  |  0.7    Ca    9.1      15 Sep 2023 04:37  Phos  3.6     09-15  Mg     2.2     09-15    TPro  6.0  /  Alb  3.9  /  TBili  0.5  /  DBili  x   /  AST  36  /  ALT  12  /  AlkPhos  69  09-15  A1C with Estimated Average Glucose Result: 5.9 % (09-14-23 @ 10:08)  A1C with Estimated Average Glucose Result: 5.9 % (09-14-23 @ 05:24)

## 2023-09-15 NOTE — DIETITIAN INITIAL EVALUATION ADULT - ORAL INTAKE PTA/DIET HISTORY
Pt unable to participate in nutrition assessment at this time; sleeping. Will attempt to obtain nutrition hx at follow up. Hx limited to medical chart.

## 2023-09-15 NOTE — CHART NOTE - NSCHARTNOTEFT_GEN_A_CORE
INPATIENT PROGRESS NOTE - Nephrology   Rupa Fisher is a 83 year old female patient admitted on 2022.  Reason for admission: Small bowel obstruction (CMS/HCC) [K56.609]    ASSESSMENT   · No new complaints  · Patient is awake and alert  · Asking for Jell-O etc. noted  · Bowel sounds plus/passing gas  · Further improvement in overall clinical/renal status-sodium 146 improving potassium 3.5 on supplements as needed and BUN 47 and creatinine 1.20 with estimated GFR of 42 CKD stage III  · Good urine output-mobilizing third space  · Intake output, diuretic on hold etc. to continue       PLAN   • See above    SUBJECTIVE   Patient complaints: Awake and alert  Asking for Jell-O etc.  Overall showing steady improvement-.  Post op      OBJECTIVE   NAVEEN improving-CKD now stage III  Latest Vitals:  Temperature Blood Pressure Heart Rate Resp Rate SpO2   Temp: 97.9 °F (36.6 °C) BP: (!) 148/62 Heart Rate: 85 Resp: 12 SpO2: 100 %     Vitals: Minimum/Maximum:    Temperature Blood Pressure Heart Rate Resp Rate SpO2   Temp  Av.5 °F (36.4 °C)  Min: 97.3 °F (36.3 °C)  Max: 97.9 °F (36.6 °C) BP  Min: 115/52  Max: 162/55 Pulse  Av.1  Min: 77  Max: 114 Resp  Av.3  Min: 11  Max: 27 SpO2  Av.6 %  Min: 90 %  Max: 100 %       I/O Summary:  Last 3 shifts Current shift 24 hours   I/O last 3 completed shifts:  In: 150 [P.O.:120; NG/GT:30]  Out: 1250 [Urine:400; Drains:850] No intake/output data recorded.   Intake/Output Summary (Last 24 hours) at 2022 0900  Last data filed at 1/15/2022 1600  Gross per 24 hour   Intake 150 ml   Output 1250 ml   Net -1100 ml        Physical Exam:     General:   NAD   HEENT:   Atraumatic, normocephalic   Neck:   Supple   CV:   RRR, +S1, +S2, no m/r/g   Pulm:  Air entry improving   Abd:  Soft, NT, ND, +BS, no rebound, no guarding   Ext:  Bilateral lower extremities no cyanosis   Neuro:    Grossly intact   Skin:    Intact over examined areas.     Psych:   Appropriate mood and affect.              Lab Data:  [unfilled]  Lab Results   Component Value Date    CALCIUM 8.5 01/16/2022    CALCIUM 8.4 10/29/2019    PHOS 2.9 01/16/2022     [unfilled]  Lab Results   Component Value Date    IRON 42 (L) 06/22/2018    TIBC 218 (L) 06/22/2018     MAGNESIUM   Date/Time Value Ref Range Status   10/27/2019 05:55 AM 2.0 1.7 - 2.4 mg/dL Final     Magnesium   Date/Time Value Ref Range Status   01/16/2022 03:37 AM 2.2 1.7 - 2.4 mg/dL Final           Urinalysis Component Data  No results found for: MUCUS  No components found for: [id number]  No components found for: [id number]        Imaging and other results:   [unfilled]      Medications:   Continuous Medications:  • sodium chloride 0.9% infusion     • sodium chloride 0.9% infusion       Scheduled Medications:  • ipratropium  2 puff Inhalation 4x Daily Resp   • budesonide-formoterol  2 puff Inhalation BID Resp   • dexamethasone  6 mg Intravenous Daily   • amLODIPine  5 mg Per NG tube Daily   • pantoprazole  40 mg Intravenous Daily   • sodium chloride (PF)  2 mL Intracatheter 2 times per day   • enoxaparin  40 mg Subcutaneous Daily   • piperacillin-tazobactam (ZOSYN) extended interval IVPB  3.375 g Intravenous Q8H         Summary:   ·     COVID infection status post COVID booster 12-21  Acute SBO 2 x 2 ventral hernia status post X lab, MIMI and hernia repair on 113  Leukocytosis  Suspect reactive  Acute hypoxic respiratory failure 2/2 underlying COPD  Obesity  Labs reviewed-BUN/creatinine steadily going up  On 1/14 BUN 45 and creatinine 1.12-repeat same day BUN 49 and creatinine 1.33 and today BUN 50 and creatinine 1.39  Acute on chronic renal failure chronic secondary to hypertensive nephrosclerosis acute secondary to prerenal azotemia-third spacing sepsis acute COVID-19 sepsis-NAVEEN  For daily medical updates, see assessment and plan above.    On behalf of [facility], I would like to thank you for allowing me to participate in Rupa medellin.    Please note this report has  CCU Transfer Note    Transfer from: CCU  Transfer to: 4T  Accepting physican:      Hospital COURSE:    83y F pmh hypothyroidism, back pain and peripheral neuropathy presented with chest pain. Patient states she started having midsternal/epigastric chest pain for ~1hr that started after eating. Pain described as non-radiating, non-exertional burning chest pain ?/10 that did not resolve with rest. Pt explained she felt like food was stuck in her throat. Patient took baking soda and ASA 325mg without relief prompting her to come to the ED. She denies any associated sxs of nausea, vomiting, shortness of breath, palpitations, diaphoresis, dizziness, confusion or lightheadedness.     Family stated that she does not have any prior cardiac history. Her  explains she once saw a Cardiologist in [place] who said she had evidence of a "leaky heart valve" but did not have any follow up. She is a prior smoker quit 35-40yrs ago. No alcohol or recreational drug use, patient only takes levothyroxine 75mg and tramadol for back/radicular pain and multivitamins. Denies any prior hx of dyspnea on exertion or exertional chest pain prior to this time.    Vitals in the ED  T 98.5  HR 89  /95  RR 18 SpO2 100 On RA    Significant Labs  wbc 10.8  hgb13.1  plt 248  Na 139 K 4.7  BUN/Cr 16/0.8  trop wnl     EKG: ST elevations in leads I and aVL, concern for acute high lateral wall ischemia     Patient received Brilinta load, heparin, morphine and nitroglycerin SL in the ED. Code STEMI called. Patient transferred to [facility] for emergent cath. Admitted to CCU.    S/P PCI:    Intervention:  Successful PCI of OM1 with balloon angioplasty s/p VIET x 2    Implants:  [place] 2.5 x 15 mm, [place] 2.75 x 22 mm to OM1    CCU COURSE:    Patient was monitored. Her vitals signs remained stable. Echo was done which showed an EF of 50-55% and G1DD. Patient remained without chest pain, SOB, or other complaints.    T(C): 36.4 (09-15-23 @ 08:00), Max: 37.2 (09-15-23 @ 04:00)  HR: 73 (09-15-23 @ 10:00) (60 - 87)  BP: 116/56 (09-15-23 @ 10:00) (78/43 - 142/62)  RR: 26 (09-15-23 @ 10:00) (16 - 41)  SpO2: 99% (09-15-23 @ 10:00) (96% - 100%)    CONSTITUTIONAL: Well groomed, no apparent distress  EYES: No conjunctival or scleral injection, non-icteric  NECK: No JVD  RESP: No respiratory distress, no use of accessory muscles; normal breath sounds b/l  CV: RRR, +S1S2, no murmurs, no peripheral edema  GI: Soft, NT, ND  MSK: No digital clubbing or cyanosis  SKIN: Multiple ecchymoses on UEs and LEs (patient states she's had them for a while and gets bruises easily)  NEURO: CN II-XII intact  PSYCH: Appropriate insight/judgment; A+O x 3, mood and affect appropriate    ASSESMENT AND PLAN:     Patient is a 83y F pmh hypothyroidism, back pain and peripheral neuropathy who presented with non-exertional chest pain admitted for STEMI.     #STEMI    - ST elevation  in leads I and aVL on admission  - Trops were WNL on admission  - Now s/p PCI of OM1 with balloon angioplasty s/p VIET x 2  - Trops 0.61 most recently  - No longer having CP  - c/w asa 81qd, brilinta 90mg bid, lipitor 80mg qhs  - continue lopressor 25 BID, losartan 25mg qd  - TTE 9/14 EF 50-55%, G1DD  - plan for staged PCI to RCA prior to d/c  - GORGE score 4 - 7.3% risk of all-cause mortality at 30 days  - Will need f/u appt with Dr. Womack in 1-2 wks of discharge    #Hypothyroidism    - Latest TSH 2.2  - Continue levothyroxine 75    #Neuropathy  #Back pain    - Patient states that she has had this for a while, and receives injections in her back for it  - Outpatient care  - HbA1c 5.9%    #Diet: Regular DASH/TLC  #GI PPx: currently none indicated, only on laxatives  #DVT PPx: enoxaparin 40 QD  #MSK: PT following    For Follow-Up: Follow trops till downtrending, ongoing discussion regarding second PCI      Vital Signs Last 24 Hrs  T(C): 36.4 (15 Sep 2023 08:00), Max: 37.2 (15 Sep 2023 04:00)  T(F): 97.6 (15 Sep 2023 08:00), Max: 98.9 (15 Sep 2023 04:00)  HR: 73 (15 Sep 2023 10:00) (60 - 87)  BP: 116/56 (15 Sep 2023 10:00) (78/43 - 142/62)  BP(mean): 80 (15 Sep 2023 10:00) (58 - 92)  RR: 26 (15 Sep 2023 10:00) (16 - 41)  SpO2: 99% (15 Sep 2023 10:00) (96% - 100%)    Parameters below as of 15 Sep 2023 08:00  Patient On (Oxygen Delivery Method): room air      I&O's Summary    14 Sep 2023 07:01  -  15 Sep 2023 07:00  --------------------------------------------------------  IN: 1080 mL / OUT: 1450 mL / NET: -370 mL    15 Sep 2023 07:01  -  15 Sep 2023 13:23  --------------------------------------------------------  IN: 240 mL / OUT: 0 mL / NET: 240 mL        MEDICATIONS  (STANDING):  aspirin enteric coated 81 milliGRAM(s) Oral daily  atorvastatin 80 milliGRAM(s) Oral at bedtime  chlorhexidine 2% Cloths 1 Application(s) Topical <User Schedule>  enoxaparin Injectable 40 milliGRAM(s) SubCutaneous every 24 hours  influenza  Vaccine (HIGH DOSE) 0.7 milliLiter(s) IntraMuscular once  levothyroxine 75 MICROGram(s) Oral daily  losartan 25 milliGRAM(s) Oral daily  metoprolol tartrate 25 milliGRAM(s) Oral two times a day  ondansetron    Tablet 4 milliGRAM(s) Oral once  polyethylene glycol 3350 17 Gram(s) Oral two times a day  senna 2 Tablet(s) Oral at bedtime  ticagrelor 90 milliGRAM(s) Oral every 12 hours    MEDICATIONS  (PRN):  acetaminophen     Tablet .. 650 milliGRAM(s) Oral every 6 hours PRN Temp greater or equal to 38C (100.4F), Mild Pain (1 - 3)        LABS                                            13.4                  Neurophils% (auto):   x      (09-15 @ 04:37):    11.46)-----------(210          Lymphocytes% (auto):  x                                             41.7                   Eosinphils% (auto):   x        Manual%: Neutrophils x    ; Lymphocytes x    ; Eosinophils x    ; Bands%: x    ; Blasts x                                    141    |  107    |  12                  Calcium: 9.1   / iCa: x      (09-15 @ 04:37)    ----------------------------<  106       Magnesium: 2.2                              3.9     |  25     |  0.7              Phosphorous: 3.6      TPro  6.0    /  Alb  3.9    /  TBili  0.5    /  DBili  x      /  AST  36     /  ALT  12     /  AlkPhos  69     15 Sep 2023 04:37 been produced using speech recognition software and may contain errors related to that system including errors in grammar, punctuation, and spelling, as well as words and phrases that may be inappropriate. If there are any questions or concerns please feel free to contact the dictating physician for clarification.    END of NOTE   Authored by Robyn Moses MD on 1/16/2022 9:00 AM

## 2023-09-15 NOTE — DIETITIAN INITIAL EVALUATION ADULT - NAME AND PHONE
Jazmyn [phone] or TEAMS    Nutrition Interventions: meals, snacks, medical nutrition supplements; Nutrition Monitoring: Diet order, PO intake, weights, labs, NFPF, body composition, BM and tolerance to medical food supplements

## 2023-09-15 NOTE — PROGRESS NOTE ADULT - SUBJECTIVE AND OBJECTIVE BOX
Cardiology Follow up STEMI, s/p PCI OM1 on 9/14    HELIO STEPHENS   83y Female  PAST MEDICAL & SURGICAL HISTORY:  Hypothyroid      Lumbar radicular pain      S/P cholecystectomy           HPI:  83y F pmh hypothyroidism, back pain and peripheral neuropathy presenting with chest pain. Patient states she started having midsternal/epigastric chest pain for ~1hr that started after eating. Pain described as non-radiating, non-exertional burning chest pain ?/10 that did not resolve with rest. Pt explained she felt like food was stuck in her throat. Patient took baking soda and ASA 325mg without relief prompting her to come to the ED. She denies any associated sxs of nausea, vomiting, shortness of breath, palpitations, diaphoresis, dizziness, confusion or lightheadedness.     Family states she does not have any prior cardiac history. Her  explains she once saw a Cardiologist in [place] who said she had evidence of a "leaky heart valve" but did not have any follow up. She is a prior smoker quit 35-40yrs ago. No alcohol or recreational drug use, patient only takes levothyroxine 75mg and tramadol for back/radicular pain and multivitamins. Denies any prior hx of dyspnea on exertion or exertional chest pain prior to today.     Vitals in the ED  T 98.5  HR 89  /95  RR 18 SpO2 100 On RA    Significant Labs  wbc 10.8  hgb13.1  plt 248  Na 139 K 4.7  BUN/Cr 16/0.8  trop wnl     EKG: ST elevations lateral leads, concern for acute lateral wall ischemia     Patient received Brilinta load, heparin, morphine and nitroglycerin SL in the ED. Code STEMI called. Patient transferred to [facility] for emergent cath. Admitted to CCU.  (13 Sep 2023 23:43)    Allergies    No Known Allergies    Intolerances      Patient without complaints, pt unsure if wants to stay for staged PCI of RCA or as OP  Denies CP, SOB, palpitations, or dizziness  No events on telemetry overnight    Vital Signs Last 24 Hrs  T(C): 36.4 (15 Sep 2023 08:00), Max: 37.2 (15 Sep 2023 04:00)  T(F): 97.6 (15 Sep 2023 08:00), Max: 98.9 (15 Sep 2023 04:00)  HR: 73 (15 Sep 2023 10:00) (60 - 87)  BP: 116/56 (15 Sep 2023 10:00) (78/43 - 142/62)  BP(mean): 80 (15 Sep 2023 10:00) (58 - 92)  RR: 26 (15 Sep 2023 10:00) (16 - 41)  SpO2: 99% (15 Sep 2023 10:00) (96% - 100%)    Parameters below as of 15 Sep 2023 08:00  Patient On (Oxygen Delivery Method): room air        MEDICATIONS  (STANDING):  aspirin enteric coated 81 milliGRAM(s) Oral daily  atorvastatin 80 milliGRAM(s) Oral at bedtime  chlorhexidine 2% Cloths 1 Application(s) Topical <User Schedule>  enoxaparin Injectable 40 milliGRAM(s) SubCutaneous every 24 hours  influenza  Vaccine (HIGH DOSE) 0.7 milliLiter(s) IntraMuscular once  levothyroxine 75 MICROGram(s) Oral daily  losartan 25 milliGRAM(s) Oral daily  metoprolol tartrate 25 milliGRAM(s) Oral two times a day  ondansetron    Tablet 4 milliGRAM(s) Oral once  polyethylene glycol 3350 17 Gram(s) Oral two times a day  senna 2 Tablet(s) Oral at bedtime  ticagrelor 90 milliGRAM(s) Oral every 12 hours    MEDICATIONS  (PRN):  acetaminophen     Tablet .. 650 milliGRAM(s) Oral every 6 hours PRN Temp greater or equal to 38C (100.4F), Mild Pain (1 - 3)      REVIEW OF SYSTEMS:          CONSTITUTIONAL: No weakness, fevers or chills          EYES/ENT: No visual changes;  No vertigo or throat pain           NECK: No pain or stiffness          RESPIRATORY: No cough, wheezing, hemoptysis          CARDIOVASCULAR: no pain, no MORRISON, no palpitations           GASTROINTESTINAL: No abdominal or epigastric pain. No nausea, vomiting, or hematemesis;           GENITOURINARY: No dysuria, frequency or hematuria          NEUROLOGICAL: No numbness or weakness          SKIN: No itching, rashes    PHYSICAL EXAM:           CONSTITUTIONAL: Well-developed; well-nourished; in no acute distress  	SKIN: warm, dry  	HEAD: Normocephalic; atraumatic  	EYES: PERRL.  	ENT: No nasal discharge, airway clear, mucous membranes moist  	NECK: Supple; non tender.  	CARD: +S1, +S2, no murmurs, gallops, or rubs. (Regular) rate and rhythm    	RESP: No wheezes, rales or rhonchi. CTA B/L  	ABD: soft ntnd, + BS x 4 quadrants  	EXT: moves all extremities,  no clubbing, cyanosis or edema  	NEURO: Alert and oriented x3, no focal deficits          PSYCH: Cooperative, appropriate          VASCULAR:  + Rad / + PTs / + DPs          EXTREMITY:  	   Right Radial: Dressing removed, access site soft, + pulses, no hematoma, no pain, no numbness, no signs and symptoms of infection             ECG:   Ventricular Rate 74 BPM    Atrial Rate 74 BPM    P-R Interval 178 ms    QRS Duration 62 ms    Q-T Interval 432 ms    QTC Calculation(Bazett) 479 ms    P Axis 27 degrees    R Axis 8 degrees    T Axis 126 degrees    Diagnosis Line Normal sinus rhythm  T wave abnormality, consider anterolateral ischemia  Prolonged QT  Abnormal ECG    Confirmed by REINALDO STEWARD MD ([id number]) on 9/15/2023 6:58:32 AM                                                                                                                  2D ECHO: Patient Name:   HELIO STEPHENS Accession #: [id number]  Medical Rec #:  [id number]        Height:      66.0 in 167.6 cm  YOB: 1940        Weight:      182.0lb 82.56 kg  Patient Age:    83 years        BSA:         1.92 m²  Patient Gender: F               BP:          132/63 mmHg      Date of Exam:        9/14/2023 11:01:35 AM  Referring Physician: Maikel Womack  Sonographer:         Eliana Reyes  Reading Physician:   José Manuel Ge MD, [facility].    Procedure:   2D Echo/Doppler/Color Doppler Complete and Echocardiogram   with               Definity Contrast.  Indications: I21.3 - ST Elevation STEMI Myocardial Infarction of   unspecified  Diagnosis:   I21.3 - ST elevation (STEMI) myocardial infarction of   unspecified               site        Summary:   1. Left ventricular ejection fraction, by visual estimation, is 50 to   55%.   2. Mildly decreased global left ventricular systolic function.   3. Moderate left ventricular hypertrophy.   4. Spectral Doppler shows impaired relaxation pattern of left   ventricular myocardial filling (Grade I diastolic dysfunction).   5. Normal left atrial size.   6. Normal right atrial size.   7. No evidence of mitral valve regurgitation.   8. Endocardial visualization was enhanced with intravenous echo contrast.      LABS:                        13.4   11.46 )-----------( 210      ( 15 Sep 2023 04:37 )             41.7     09-15    141  |  107  |  12  ----------------------------<  106<H>  3.9   |  25  |  0.7    Ca    9.1      15 Sep 2023 04:37  Phos  3.6     09-15  Mg     2.2     09-15    TPro  6.0  /  Alb  3.9  /  TBili  0.5  /  DBili  x   /  AST  36  /  ALT  12  /  AlkPhos  69  09-15    CARDIAC MARKERS ( 15 Sep 2023 04:37 )  x     / 0.61 ng/mL / x     / x     / x      CARDIAC MARKERS ( 15 Sep 2023 01:29 )  x     / 0.61 ng/mL / x     / x     / x      CARDIAC MARKERS ( 14 Sep 2023 15:39 )  x     / 0.61 ng/mL / x     / x     / x      CARDIAC MARKERS ( 13 Sep 2023 21:40 )  x     / <0.01 ng/mL / x     / x     / x          Magnesium: 2.2 mg/dL [1.8 - 2.4] (09-15-23 @ 04:37)  LIVER FUNCTIONS - ( 15 Sep 2023 04:37 )  Alb: 3.9 g/dL / Pro: 6.0 g/dL / ALK PHOS: 69 U/L / ALT: 12 U/L / AST: 36 U/L / GGT: x             A/P:  I discussed the case with Cardiologist Dr. Womack and recommend the following:    S/P PCI:    Intervention:  Successful PCI of OM1 with balloon angioplasty s/p VIET x 2    Implants:  Steven [place] 2.5 x 15 mm, Steven [place] 2.75 x 22 mm to OM1                  AUC: 9     FINDINGS:   Coronary Dominance: Right  LM: Mild disease diffusely  LAD: Mild disease diffusely  D1: Diffusely disease  CX: Proximal segment 70% stenosis, distal segment 50% stenosis  OM1: Two segments with focal stenosis - 90% and 95%  OM2: Diffuse mild disease  RCA: Mid segment 90% stenosis, distal segment mild disease  RPDA: severe disease, small    LVEDP:  16mmHg      ESTIMATED BLOOD LOSS: < 10 mL      CONDITION:   [x] Good   [ ] Fair   [ ] Critical     SPECIMEN REMOVED: N/A     POST-OP DIAGNOSIS:    - Severe 2 vessel disease - OM1 and RCA  - s/p successful PCI of OM1 with balloon angioplasty s/p VIET x 2      PLAN OF CARE:   [x] Admit to CCU  [x] Plan for Staged PCI of RCA in the near future  [X] Medications: ASA, Brilinta, statin, BB  [X] IV Fluids: NS @ 150cc/h x 8 hr  [x] Remove D-stat.      Electronic Signatures:  Maikel Womack)  (Signed 14-Sep-2023 08:47)  	Authored: Note  	Co-Signer: Note Type, Note  Ilia Quevedo)  (Signed 14-Sep-2023 07:20)  	Authored: Note Type, Note      Last Updated: 14-Sep-2023 08:47 by Maikel Womack)    	     Continue DAPT (Ec Asa 81mg po daily, Brilinta 90mg po q12),  B-Blocker, Statin Therapy, ARB                   add GI prophylaxis                   Brilinta copay $30/month, pt aware and agreeable                   OOB-CH, ambulate with assistance                    monitor access site/pulses                   Patient agreeing to take DAPT for at least one year or as directed by cardiologist                    Pt given instructions on importance of taking antiplatelet medication or risk acute stent thrombosis/death                   Post cath instructions, access site care and activity restrictions reviewed with patient                      Benefits of Cardiac Rehab discussed with patient and all documents sent to Cardiac Rehab Center                   Patient instructed to call Cardiac Rehab and make first appointment after first f/u visit with Cardiologist                    Discussed with patient to return to hospital if experience chest pain, shortness breath, dizziness and site bleeding                   Aggressive risk factor modification, diet counseling, smoking cessation discussed with patient                       Pt to decide if will stay for staged PCI on Monday or return as OP                   Can discharge patient from Interventional Cardiology standpoint if labs/ekg/site WNL and ambulating without symptoms if decides to do staged PCI RCA as OP                    Follow up with Cardiology Dr. Womack in 1-2 weeks.  Patient instructed to call and make an appointment                      Discharge instructions as follows:                   - Continue medical regimen as prescribed to prevent chest pain                   - Continue dual anti-platelet therapy, beta blocker, statin, ARB                   - If you are diabetic and taking medication containing Metformin, do not take them for 48 hours after the procedure                   - Instructed to call 911 if chest pain, shortness of breath or bleeding from access site                   - No heavy lifting >10lbs x 1 week                   - No driving x 24 hours                   - No baths, swimming pools x 1 week, may shower                   - Low sodium low fat low cholesterol diet                   - Follow-up with Cardiologist in 1-2 weeks after discharge                                      Cardiology Follow up STEMI, s/p PCI OM1 on 9/14    HELIO STEPHENS   83y Female  PAST MEDICAL & SURGICAL HISTORY:  Hypothyroid      Lumbar radicular pain      S/P cholecystectomy           HPI:  83y F pmh hypothyroidism, back pain and peripheral neuropathy presenting with chest pain. Patient states she started having midsternal/epigastric chest pain for ~1hr that started after eating. Pain described as non-radiating, non-exertional burning chest pain ?/10 that did not resolve with rest. Pt explained she felt like food was stuck in her throat. Patient took baking soda and ASA 325mg without relief prompting her to come to the ED. She denies any associated sxs of nausea, vomiting, shortness of breath, palpitations, diaphoresis, dizziness, confusion or lightheadedness.     Family states she does not have any prior cardiac history. Her  explains she once saw a Cardiologist in [place] who said she had evidence of a "leaky heart valve" but did not have any follow up. She is a prior smoker quit 35-40yrs ago. No alcohol or recreational drug use, patient only takes levothyroxine 75mg and tramadol for back/radicular pain and multivitamins. Denies any prior hx of dyspnea on exertion or exertional chest pain prior to today.     Vitals in the ED  T 98.5  HR 89  /95  RR 18 SpO2 100 On RA    Significant Labs  wbc 10.8  hgb13.1  plt 248  Na 139 K 4.7  BUN/Cr 16/0.8  trop wnl     EKG: ST elevations lateral leads, concern for acute lateral wall ischemia     Patient received Brilinta load, heparin, morphine and nitroglycerin SL in the ED. Code STEMI called. Patient transferred to [facility] for emergent cath. Admitted to CCU.  (13 Sep 2023 23:43)    Allergies    No Known Allergies    Intolerances      Patient without complaints, pt unsure if wants to stay for staged PCI of RCA or as OP  Denies CP, SOB, palpitations, or dizziness  No events on telemetry overnight    Vital Signs Last 24 Hrs  T(C): 36.4 (15 Sep 2023 08:00), Max: 37.2 (15 Sep 2023 04:00)  T(F): 97.6 (15 Sep 2023 08:00), Max: 98.9 (15 Sep 2023 04:00)  HR: 73 (15 Sep 2023 10:00) (60 - 87)  BP: 116/56 (15 Sep 2023 10:00) (78/43 - 142/62)  BP(mean): 80 (15 Sep 2023 10:00) (58 - 92)  RR: 26 (15 Sep 2023 10:00) (16 - 41)  SpO2: 99% (15 Sep 2023 10:00) (96% - 100%)    Parameters below as of 15 Sep 2023 08:00  Patient On (Oxygen Delivery Method): room air        MEDICATIONS  (STANDING):  aspirin enteric coated 81 milliGRAM(s) Oral daily  atorvastatin 80 milliGRAM(s) Oral at bedtime  chlorhexidine 2% Cloths 1 Application(s) Topical <User Schedule>  enoxaparin Injectable 40 milliGRAM(s) SubCutaneous every 24 hours  influenza  Vaccine (HIGH DOSE) 0.7 milliLiter(s) IntraMuscular once  levothyroxine 75 MICROGram(s) Oral daily  losartan 25 milliGRAM(s) Oral daily  metoprolol tartrate 25 milliGRAM(s) Oral two times a day  ondansetron    Tablet 4 milliGRAM(s) Oral once  polyethylene glycol 3350 17 Gram(s) Oral two times a day  senna 2 Tablet(s) Oral at bedtime  ticagrelor 90 milliGRAM(s) Oral every 12 hours    MEDICATIONS  (PRN):  acetaminophen     Tablet .. 650 milliGRAM(s) Oral every 6 hours PRN Temp greater or equal to 38C (100.4F), Mild Pain (1 - 3)      REVIEW OF SYSTEMS:          CONSTITUTIONAL: No weakness, fevers or chills          EYES/ENT: No visual changes;  No vertigo or throat pain           NECK: No pain or stiffness          RESPIRATORY: No cough, wheezing, hemoptysis          CARDIOVASCULAR: no pain, no MORRISON, no palpitations           GASTROINTESTINAL: No abdominal or epigastric pain. No nausea, vomiting, or hematemesis;           GENITOURINARY: No dysuria, frequency or hematuria          NEUROLOGICAL: No numbness or weakness          SKIN: No itching, rashes    PHYSICAL EXAM:           CONSTITUTIONAL: Well-developed; well-nourished; in no acute distress  	SKIN: warm, dry  	HEAD: Normocephalic; atraumatic  	EYES: PERRL.  	ENT: No nasal discharge, airway clear, mucous membranes moist  	NECK: Supple; non tender.  	CARD: +S1, +S2, no murmurs, gallops, or rubs. (Regular) rate and rhythm    	RESP: No wheezes, rales or rhonchi. CTA B/L  	ABD: soft ntnd, + BS x 4 quadrants  	EXT: moves all extremities,  no clubbing, cyanosis or edema  	NEURO: Alert and oriented x3, no focal deficits          PSYCH: Cooperative, appropriate          VASCULAR:  + Rad / + PTs / + DPs          EXTREMITY:  	   Right Radial: Dressing removed, access site soft, + pulses, no hematoma, no pain, no numbness, no signs and symptoms of infection             ECG:   Ventricular Rate 74 BPM    Atrial Rate 74 BPM    P-R Interval 178 ms    QRS Duration 62 ms    Q-T Interval 432 ms    QTC Calculation(Bazett) 479 ms    P Axis 27 degrees    R Axis 8 degrees    T Axis 126 degrees    Diagnosis Line Normal sinus rhythm  T wave abnormality, consider anterolateral ischemia  Prolonged QT  Abnormal ECG    Confirmed by REINALDO STEWARD MD ([id number]) on 9/15/2023 6:58:32 AM                                                                                                                  2D ECHO: Patient Name:   HELIO STEPHENS Accession #: [id number]  Medical Rec #:  [id number]        Height:      66.0 in 167.6 cm  YOB: 1940        Weight:      182.0lb 82.56 kg  Patient Age:    83 years        BSA:         1.92 m²  Patient Gender: F               BP:          132/63 mmHg      Date of Exam:        9/14/2023 11:01:35 AM  Referring Physician: Maikel Womack  Sonographer:         Eliana Reyes  Reading Physician:   José Manuel Ge MD, [facility].    Procedure:   2D Echo/Doppler/Color Doppler Complete and Echocardiogram   with               Definity Contrast.  Indications: I21.3 - ST Elevation STEMI Myocardial Infarction of   unspecified  Diagnosis:   I21.3 - ST elevation (STEMI) myocardial infarction of   unspecified               site        Summary:   1. Left ventricular ejection fraction, by visual estimation, is 50 to   55%.   2. Mildly decreased global left ventricular systolic function.   3. Moderate left ventricular hypertrophy.   4. Spectral Doppler shows impaired relaxation pattern of left   ventricular myocardial filling (Grade I diastolic dysfunction).   5. Normal left atrial size.   6. Normal right atrial size.   7. No evidence of mitral valve regurgitation.   8. Endocardial visualization was enhanced with intravenous echo contrast.      LABS:                        13.4   11.46 )-----------( 210      ( 15 Sep 2023 04:37 )             41.7     09-15    141  |  107  |  12  ----------------------------<  106<H>  3.9   |  25  |  0.7    Ca    9.1      15 Sep 2023 04:37  Phos  3.6     09-15  Mg     2.2     09-15    TPro  6.0  /  Alb  3.9  /  TBili  0.5  /  DBili  x   /  AST  36  /  ALT  12  /  AlkPhos  69  09-15    CARDIAC MARKERS ( 15 Sep 2023 04:37 )  x     / 0.61 ng/mL / x     / x     / x      CARDIAC MARKERS ( 15 Sep 2023 01:29 )  x     / 0.61 ng/mL / x     / x     / x      CARDIAC MARKERS ( 14 Sep 2023 15:39 )  x     / 0.61 ng/mL / x     / x     / x      CARDIAC MARKERS ( 13 Sep 2023 21:40 )  x     / <0.01 ng/mL / x     / x     / x          Magnesium: 2.2 mg/dL [1.8 - 2.4] (09-15-23 @ 04:37)  LIVER FUNCTIONS - ( 15 Sep 2023 04:37 )  Alb: 3.9 g/dL / Pro: 6.0 g/dL / ALK PHOS: 69 U/L / ALT: 12 U/L / AST: 36 U/L / GGT: x             A/P:  I discussed the case with Cardiologist Dr. Womack and recommend the following:    S/P PCI:    Intervention:  Successful PCI of OM1 with balloon angioplasty s/p VIET x 2    Implants:  Steven [place] 2.5 x 15 mm, Steven [place] 2.75 x 22 mm to OM1                  AUC: 9     FINDINGS:   Coronary Dominance: Right  LM: Mild disease diffusely  LAD: Mild disease diffusely  D1: Diffusely disease  CX: Proximal segment 70% stenosis, distal segment 50% stenosis  OM1: Two segments with focal stenosis - 90% and 95%  OM2: Diffuse mild disease  RCA: Mid segment 90% stenosis, distal segment mild disease  RPDA: severe disease, small    LVEDP:  16mmHg      ESTIMATED BLOOD LOSS: < 10 mL      CONDITION:   [x] Good   [ ] Fair   [ ] Critical     SPECIMEN REMOVED: N/A     POST-OP DIAGNOSIS:    - Severe 2 vessel disease - OM1 and RCA  - s/p successful PCI of OM1 with balloon angioplasty s/p VIET x 2      PLAN OF CARE:   [x] Admit to CCU  [x] Plan for Staged PCI of RCA in the near future  [X] Medications: ASA, Brilinta, statin, BB  [X] IV Fluids: NS @ 150cc/h x 8 hr  [x] Remove D-stat.      Electronic Signatures:  Maikel Womack)  (Signed 14-Sep-2023 08:47)  	Authored: Note  	Co-Signer: Note Type, Note  Ilia Quevedo)  (Signed 14-Sep-2023 07:20)  	Authored: Note Type, Note      Last Updated: 14-Sep-2023 08:47 by Maikel Womack)    	     Continue DAPT (Ec Asa 81mg po daily, Brilinta 90mg po q12),  B-Blocker, Statin Therapy, ARB                   add GI prophylaxis                   Brilinta copay $30/month, pt aware and agreeable                   OOB-CH, ambulate with assistance                    monitor access site/pulses                   Patient agreeing to take DAPT for at least one year or as directed by cardiologist                    Pt given instructions on importance of taking antiplatelet medication or risk acute stent thrombosis/death                   Post cath instructions, access site care and activity restrictions reviewed with patient                      Benefits of Cardiac Rehab discussed with patient and all documents sent to Cardiac Rehab Center                   Patient instructed to call Cardiac Rehab and make first appointment after first f/u visit with Cardiologist                    Discussed with patient to return to hospital if experience chest pain, shortness breath, dizziness and site bleeding                   Aggressive risk factor modification, diet counseling, smoking cessation discussed with patient                       staged PCI on Monday was discussed, pt wanted to think about it                   Can discharge patient from Interventional Cardiology standpoint if labs/ekg/site WNL and ambulating without symptoms if decides to do staged PCI RCA as OP                    Follow up with Cardiology Dr. Womack in 1-2 weeks.  Patient instructed to call and make an appointment                      Discharge instructions as follows:                   - Continue medical regimen as prescribed to prevent chest pain                   - Continue dual anti-platelet therapy, beta blocker, statin, ARB                   - If you are diabetic and taking medication containing Metformin, do not take them for 48 hours after the procedure                   - Instructed to call 911 if chest pain, shortness of breath or bleeding from access site                   - No heavy lifting >10lbs x 1 week                   - No driving x 24 hours                   - No baths, swimming pools x 1 week, may shower                   - Low sodium low fat low cholesterol diet                   - Follow-up with Cardiologist in 1-2 weeks after discharge

## 2023-09-15 NOTE — DIETITIAN INITIAL EVALUATION ADULT - OTHER CALCULATIONS
Using ABW: ENERGY: 8709-0193 kcal/day (MSJ 1300.39* 1-1.2 SF); PROTEIN: 83-91 g/day (1-1.1 g/kg); FLUID: 2070 mL/day (25 mL/kg) team to adjust prn -- with consideration for age, BMI

## 2023-09-16 LAB
ALBUMIN SERPL ELPH-MCNC: 3.8 G/DL — SIGNIFICANT CHANGE UP (ref 3.5–5.2)
ALP SERPL-CCNC: 69 U/L — SIGNIFICANT CHANGE UP (ref 30–115)
ALT FLD-CCNC: 11 U/L — SIGNIFICANT CHANGE UP (ref 0–41)
ANION GAP SERPL CALC-SCNC: 13 MMOL/L — SIGNIFICANT CHANGE UP (ref 7–14)
AST SERPL-CCNC: 26 U/L — SIGNIFICANT CHANGE UP (ref 0–41)
BASOPHILS # BLD AUTO: 0.07 K/UL — SIGNIFICANT CHANGE UP (ref 0–0.2)
BASOPHILS NFR BLD AUTO: 0.7 % — SIGNIFICANT CHANGE UP (ref 0–1)
BILIRUB SERPL-MCNC: 0.3 MG/DL — SIGNIFICANT CHANGE UP (ref 0.2–1.2)
BUN SERPL-MCNC: 20 MG/DL — SIGNIFICANT CHANGE UP (ref 10–20)
CALCIUM SERPL-MCNC: 8.9 MG/DL — SIGNIFICANT CHANGE UP (ref 8.4–10.5)
CHLORIDE SERPL-SCNC: 105 MMOL/L — SIGNIFICANT CHANGE UP (ref 98–110)
CO2 SERPL-SCNC: 23 MMOL/L — SIGNIFICANT CHANGE UP (ref 17–32)
CREAT SERPL-MCNC: 0.8 MG/DL — SIGNIFICANT CHANGE UP (ref 0.7–1.5)
EGFR: 73 ML/MIN/1.73M2 — SIGNIFICANT CHANGE UP
EOSINOPHIL # BLD AUTO: 0.31 K/UL — SIGNIFICANT CHANGE UP (ref 0–0.7)
EOSINOPHIL NFR BLD AUTO: 2.9 % — SIGNIFICANT CHANGE UP (ref 0–8)
GLUCOSE BLDC GLUCOMTR-MCNC: 104 MG/DL — HIGH (ref 70–99)
GLUCOSE BLDC GLUCOMTR-MCNC: 108 MG/DL — HIGH (ref 70–99)
GLUCOSE BLDC GLUCOMTR-MCNC: 123 MG/DL — HIGH (ref 70–99)
GLUCOSE BLDC GLUCOMTR-MCNC: 126 MG/DL — HIGH (ref 70–99)
GLUCOSE SERPL-MCNC: 105 MG/DL — HIGH (ref 70–99)
HCT VFR BLD CALC: 41.2 % — SIGNIFICANT CHANGE UP (ref 37–47)
HGB BLD-MCNC: 13.1 G/DL — SIGNIFICANT CHANGE UP (ref 12–16)
IMM GRANULOCYTES NFR BLD AUTO: 0.5 % — HIGH (ref 0.1–0.3)
LYMPHOCYTES # BLD AUTO: 2.13 K/UL — SIGNIFICANT CHANGE UP (ref 1.2–3.4)
LYMPHOCYTES # BLD AUTO: 20 % — LOW (ref 20.5–51.1)
MCHC RBC-ENTMCNC: 28.1 PG — SIGNIFICANT CHANGE UP (ref 27–31)
MCHC RBC-ENTMCNC: 31.8 G/DL — LOW (ref 32–37)
MCV RBC AUTO: 88.4 FL — SIGNIFICANT CHANGE UP (ref 81–99)
MONOCYTES # BLD AUTO: 0.84 K/UL — HIGH (ref 0.1–0.6)
MONOCYTES NFR BLD AUTO: 7.9 % — SIGNIFICANT CHANGE UP (ref 1.7–9.3)
NEUTROPHILS # BLD AUTO: 7.27 K/UL — HIGH (ref 1.4–6.5)
NEUTROPHILS NFR BLD AUTO: 68 % — SIGNIFICANT CHANGE UP (ref 42.2–75.2)
NRBC # BLD: 0 /100 WBCS — SIGNIFICANT CHANGE UP (ref 0–0)
PLATELET # BLD AUTO: 192 K/UL — SIGNIFICANT CHANGE UP (ref 130–400)
PMV BLD: 10.6 FL — HIGH (ref 7.4–10.4)
POTASSIUM SERPL-MCNC: 4.1 MMOL/L — SIGNIFICANT CHANGE UP (ref 3.5–5)
POTASSIUM SERPL-SCNC: 4.1 MMOL/L — SIGNIFICANT CHANGE UP (ref 3.5–5)
PROT SERPL-MCNC: 6.2 G/DL — SIGNIFICANT CHANGE UP (ref 6–8)
RBC # BLD: 4.66 M/UL — SIGNIFICANT CHANGE UP (ref 4.2–5.4)
RBC # FLD: 14 % — SIGNIFICANT CHANGE UP (ref 11.5–14.5)
SODIUM SERPL-SCNC: 141 MMOL/L — SIGNIFICANT CHANGE UP (ref 135–146)
TROPONIN T SERPL-MCNC: 0.46 NG/ML — CRITICAL HIGH
WBC # BLD: 10.67 K/UL — SIGNIFICANT CHANGE UP (ref 4.8–10.8)
WBC # FLD AUTO: 10.67 K/UL — SIGNIFICANT CHANGE UP (ref 4.8–10.8)

## 2023-09-16 PROCEDURE — 99233 SBSQ HOSP IP/OBS HIGH 50: CPT

## 2023-09-16 PROCEDURE — 93010 ELECTROCARDIOGRAM REPORT: CPT

## 2023-09-16 RX ORDER — PANTOPRAZOLE SODIUM 20 MG/1
40 TABLET, DELAYED RELEASE ORAL
Refills: 0 | Status: DISCONTINUED | OUTPATIENT
Start: 2023-09-16 | End: 2023-09-19

## 2023-09-16 RX ADMIN — POLYETHYLENE GLYCOL 3350 17 GRAM(S): 17 POWDER, FOR SOLUTION ORAL at 06:25

## 2023-09-16 RX ADMIN — TICAGRELOR 90 MILLIGRAM(S): 90 TABLET ORAL at 06:12

## 2023-09-16 RX ADMIN — Medication 25 MILLIGRAM(S): at 17:51

## 2023-09-16 RX ADMIN — TICAGRELOR 90 MILLIGRAM(S): 90 TABLET ORAL at 17:52

## 2023-09-16 RX ADMIN — ENOXAPARIN SODIUM 40 MILLIGRAM(S): 100 INJECTION SUBCUTANEOUS at 06:15

## 2023-09-16 RX ADMIN — Medication 81 MILLIGRAM(S): at 12:01

## 2023-09-16 RX ADMIN — ATORVASTATIN CALCIUM 80 MILLIGRAM(S): 80 TABLET, FILM COATED ORAL at 21:15

## 2023-09-16 RX ADMIN — SENNA PLUS 2 TABLET(S): 8.6 TABLET ORAL at 21:16

## 2023-09-16 RX ADMIN — POLYETHYLENE GLYCOL 3350 17 GRAM(S): 17 POWDER, FOR SOLUTION ORAL at 17:54

## 2023-09-16 RX ADMIN — LOSARTAN POTASSIUM 25 MILLIGRAM(S): 100 TABLET, FILM COATED ORAL at 06:12

## 2023-09-16 RX ADMIN — CHLORHEXIDINE GLUCONATE 1 APPLICATION(S): 213 SOLUTION TOPICAL at 06:25

## 2023-09-16 RX ADMIN — Medication 25 MILLIGRAM(S): at 06:17

## 2023-09-16 RX ADMIN — Medication 75 MICROGRAM(S): at 06:12

## 2023-09-16 NOTE — PROGRESS NOTE ADULT - SUBJECTIVE AND OBJECTIVE BOX
Chief complaint: Patient is a 83y old  Female who presents with a chief complaint of ST elevation myocardial infarction (STEMI)     (15 Sep 2023 11:51)    Interval history:  Pt denies any chest pain, SOB, or palpitations    Review of systems: A complete 10-point review of systems was obtained and is negative except as stated in the interval history.    Vitals:  T(F): 97.5, Max: 98.8 (09-15 @ 23:43)  HR: 67 (67 - 88)  BP: 155/70 (89/52 - 155/70)  RR: 20 (20 - 40)  SpO2: 97% (96% - 99%)    Ins & outs:     09-13 @ 07:01  -  09-14 @ 07:00  --------------------------------------------------------  IN: 1000 mL / OUT: 1050 mL / NET: -50 mL    09-14 @ 07:01  -  09-15 @ 07:00  --------------------------------------------------------  IN: 1080 mL / OUT: 1450 mL / NET: -370 mL    09-15 @ 07:01  -  09-16 @ 07:00  --------------------------------------------------------  IN: 1200 mL / OUT: 900 mL / NET: 300 mL    09-16 @ 07:01  -  09-16 @ 12:41  --------------------------------------------------------  IN: 210 mL / OUT: 400 mL / NET: -190 mL      Weight trend:  Weight (kg): 81.5 (09-16)    Physical exam:  General: No apparent distress  HEENT: Anicteric sclera. Moist mucous membranes. JVD -   Cardiac: Regular rate and rhythm. No murmurs, rubs, or gallops.   Vascular: Symmetric radial pulses. Dorsalis pedis pulses palpable.   Respiratory: Normal effort. Clear to ascultation.   Abdomen: Soft, nontender. Audible bowel sounds.   Extremities: Warm with no edema. No cyanosis or clubbing.   Right Radial site: +ecchymosis, no hematoma, 2+ Radial   Skin: Warm and dry. No rash.   Neurologic: Grossly normal motor function.   Psychiatric: Oriented to person, place, and time.     Data reviewed:  - Telemetry:  67-88    - ECG (9/15/23):   Ventricular Rate 74 BPM    Atrial Rate 74 BPM    P-R Interval 178 ms    QRS Duration 62 ms    Q-T Interval 432 ms    QTC Calculation(Bazett) 479 ms    P Axis 27 degrees    R Axis 8 degrees    T Axis 126 degrees    Diagnosis Line Normal sinus rhythm  T wave abnormality, consider anterolateral ischemia  Prolonged QT    - TTE (9/14/23):   Summary:   1. Left ventricular ejection fraction, by visual estimation, is 50 to   55%.   2. Mildly decreased global left ventricular systolic function.   3. Moderate left ventricular hypertrophy.   4. Spectral Doppler shows impaired relaxation pattern of left   ventricular myocardial filling (Grade I diastolic dysfunction).   5. Normal left atrial size.   6. Normal right atrial size.   7. No evidence of mitral valve regurgitation.   8. Endocardial visualization was enhanced with intravenous echo contrast.    - Chest x-ray (9/15/23):   Impression:    No radiographic evidence of acute cardiopulmonary disease.    - Cardiac catheterization 9/13/23:  Intervention:  Successful PCI of OM1 with balloon angioplasty s/p VIET x 2    Implants:  [place] 2.5 x 15 mm, [place] 2.75 x 22 mm to OM1                   FINDINGS:   Coronary Dominance: Right  LM: Mild disease diffusely  LAD: Mild disease diffusely  D1: Diffusely disease  CX: Proximal segment 70% stenosis, distal segment 50% stenosis  OM1: Two segments with focal stenosis - 90% and 95%  OM2: Diffuse mild disease  RCA: Mid segment 90% stenosis, distal segment mild disease  RPDA: severe disease, small      - Labs:                        13.1   10.67 )-----------( 192      ( 16 Sep 2023 05:40 )             41.2     09-16    141  |  105  |  20  ----------------------------<  105<H>  4.1   |  23  |  0.8    Ca    8.9      16 Sep 2023 05:40  Phos  3.6     09-15  Mg     2.2     09-15    TPro  6.2  /  Alb  3.8  /  TBili  0.3  /  DBili  x   /  AST  26  /  ALT  11  /  AlkPhos  69  09-16    Troponin T, Serum: 0.46 ng/mL (09-16-23 @ 05:40)  Troponin T, Serum: 0.61 ng/mL (09-15-23 @ 04:37)  Troponin T, Serum: 0.61 ng/mL (09-15-23 @ 01:29)  Troponin T, Serum: 0.61 ng/mL (09-14-23 @ 15:39)  Troponin T, Serum: <0.01 ng/mL (09-13-23 @ 21:40)    Triglycerides, Serum: 141 mg/dL (09-14-23 @ 05:24)  LDL Cholesterol Calculated: 108 mg/dL (09-14-23 @ 05:24)    Thyroid Stimulating Hormone, Serum: 2.20 uIU/mL (09-14-23 @ 05:24)  Thyroid Stimulating Hormone, Serum: 2.18 uIU/mL (09-14-23 @ 05:24)    Urinalysis Basic - ( 16 Sep 2023 05:40 )    Color: x / Appearance: x / SG: x / pH: x  Gluc: 105 mg/dL / Ketone: x  / Bili: x / Urobili: x   Blood: x / Protein: x / Nitrite: x   Leuk Esterase: x / RBC: x / WBC x   Sq Epi: x / Non Sq Epi: x / Bacteria: x      Medications:  aspirin enteric coated 81 milliGRAM(s) Oral daily  atorvastatin 80 milliGRAM(s) Oral at bedtime  chlorhexidine 2% Cloths 1 Application(s) Topical <User Schedule>  enoxaparin Injectable 40 milliGRAM(s) SubCutaneous every 24 hours  influenza  Vaccine (HIGH DOSE) 0.7 milliLiter(s) IntraMuscular once  levothyroxine 75 MICROGram(s) Oral daily  losartan 25 milliGRAM(s) Oral daily  metoprolol tartrate 25 milliGRAM(s) Oral two times a day  ondansetron    Tablet 4 milliGRAM(s) Oral once  pantoprazole    Tablet 40 milliGRAM(s) Oral before breakfast  polyethylene glycol 3350 17 Gram(s) Oral two times a day  senna 2 Tablet(s) Oral at bedtime  ticagrelor 90 milliGRAM(s) Oral every 12 hours      Allergies    No Known Allergies                   Chief complaint: Patient is a 83y old  Female who presents with a chief complaint of ST elevation myocardial infarction (STEMI)     (15 Sep 2023 11:51)    Interval history:  Pt denies any chest pain, SOB, or palpitations    Review of systems: A complete 10-point review of systems was obtained and is negative except as stated in the interval history.    Vitals:  T(F): 97.5, Max: 98.8 (09-15 @ 23:43)  HR: 67 (67 - 88)  BP: 155/70 (89/52 - 155/70)  RR: 20 (20 - 40)  SpO2: 97% (96% - 99%)    Ins & outs:     09-13 @ 07:01  -  09-14 @ 07:00  --------------------------------------------------------  IN: 1000 mL / OUT: 1050 mL / NET: -50 mL    09-14 @ 07:01  -  09-15 @ 07:00  --------------------------------------------------------  IN: 1080 mL / OUT: 1450 mL / NET: -370 mL    09-15 @ 07:01  -  09-16 @ 07:00  --------------------------------------------------------  IN: 1200 mL / OUT: 900 mL / NET: 300 mL    09-16 @ 07:01  -  09-16 @ 12:41  --------------------------------------------------------  IN: 210 mL / OUT: 400 mL / NET: -190 mL      Weight trend:  Weight (kg): 81.5 (09-16)    Physical exam:  General: No apparent distress  HEENT: Anicteric sclera. Moist mucous membranes. JVD -   Cardiac: Regular rate and rhythm. No murmurs, rubs, or gallops.   Vascular: Symmetric radial pulses. Dorsalis pedis pulses palpable.   Respiratory: Normal effort. Clear to ascultation.   Abdomen: Soft, nontender. Audible bowel sounds.   Extremities: Warm with no edema. No cyanosis or clubbing.   Right Radial site: +ecchymosis, no hematoma, 2+ Radial   Skin: Warm and dry. No rash.   Neurologic: Grossly normal motor function.   Psychiatric: Oriented to person, place, and time.     Data reviewed:  - Telemetry:  67-88    - ECG (9/15/23):   Ventricular Rate 74 BPM    Atrial Rate 74 BPM    P-R Interval 178 ms    QRS Duration 62 ms    Q-T Interval 432 ms    QTC Calculation(Bazett) 479 ms    P Axis 27 degrees    R Axis 8 degrees    T Axis 126 degrees    Diagnosis Line Normal sinus rhythm  T wave abnormality, consider anterolateral ischemia  Prolonged QT    - TTE (9/14/23):   Summary:   1. Left ventricular ejection fraction, by visual estimation, is 50 to   55%.   2. Mildly decreased global left ventricular systolic function.   3. Moderate left ventricular hypertrophy.   4. Spectral Doppler shows impaired relaxation pattern of left   ventricular myocardial filling (Grade I diastolic dysfunction).   5. Normal left atrial size.   6. Normal right atrial size.   7. No evidence of mitral valve regurgitation.   8. Endocardial visualization was enhanced with intravenous echo contrast.    - Chest x-ray (9/15/23):   Impression:    No radiographic evidence of acute cardiopulmonary disease.    - Cardiac catheterization 9/13/23:  Intervention:  Successful PCI of OM1 with balloon angioplasty s/p VIET x 2    Implants:  [place] 2.5 x 15 mm, [place] 2.75 x 22 mm to OM1                   FINDINGS:   Coronary Dominance: Right  LM: Mild disease diffusely  LAD: Mild disease diffusely  D1: Diffusely disease  CX: Proximal segment 70% stenosis, distal segment 50% stenosis  OM1: Two segments with focal stenosis - 90% and 95%  OM2: Diffuse mild disease  RCA: Mid segment 90% stenosis, distal segment mild disease  RPDA: severe disease, small      - Labs:                        13.1   10.67 )-----------( 192      ( 16 Sep 2023 05:40 )             41.2     09-16    141  |  105  |  20  ----------------------------<  105<H>  4.1   |  23  |  0.8    Ca    8.9      16 Sep 2023 05:40  Phos  3.6     09-15  Mg     2.2     09-15    TPro  6.2  /  Alb  3.8  /  TBili  0.3  /  DBili  x   /  AST  26  /  ALT  11  /  AlkPhos  69  09-16    Troponin T, Serum: 0.46 ng/mL (09-16-23 @ 05:40)  Troponin T, Serum: 0.61 ng/mL (09-15-23 @ 04:37)  Troponin T, Serum: 0.61 ng/mL (09-15-23 @ 01:29)  Troponin T, Serum: 0.61 ng/mL (09-14-23 @ 15:39)  Troponin T, Serum: <0.01 ng/mL (09-13-23 @ 21:40)    Triglycerides, Serum: 141 mg/dL (09-14-23 @ 05:24)  LDL Cholesterol Calculated: 108 mg/dL (09-14-23 @ 05:24)    Thyroid Stimulating Hormone, Serum: 2.20 uIU/mL (09-14-23 @ 05:24)  Thyroid Stimulating Hormone, Serum: 2.18 uIU/mL (09-14-23 @ 05:24)    Urinalysis Basic - ( 16 Sep 2023 05:40 )    Color: x / Appearance: x / SG: x / pH: x  Gluc: 105 mg/dL / Ketone: x  / Bili: x / Urobili: x   Blood: x / Protein: x / Nitrite: x   Leuk Esterase: x / RBC: x / WBC x   Sq Epi: x / Non Sq Epi: x / Bacteria: x      Medications:  aspirin enteric coated 81 milliGRAM(s) Oral daily  atorvastatin 80 milliGRAM(s) Oral at bedtime  chlorhexidine 2% Cloths 1 Application(s) Topical <User Schedule>  enoxaparin Injectable 40 milliGRAM(s) SubCutaneous every 24 hours  influenza  Vaccine (HIGH DOSE) 0.7 milliLiter(s) IntraMuscular once  levothyroxine 75 MICROGram(s) Oral daily  losartan 25 milliGRAM(s) Oral daily  metoprolol tartrate 25 milliGRAM(s) Oral two times a day  ondansetron    Tablet 4 milliGRAM(s) Oral once  pantoprazole    Tablet 40 milliGRAM(s) Oral before breakfast  polyethylene glycol 3350 17 Gram(s) Oral two times a day  senna 2 Tablet(s) Oral at bedtime  ticagrelor 90 milliGRAM(s) Oral every 12 hours

## 2023-09-16 NOTE — PROGRESS NOTE ADULT - ASSESSMENT
ASSESMENT:   83y F with PMHx of hypothyroidism, back pain and peripheral neuropathy, who presented with non-exertional chest pain, admitted for STEMI, s/p PCI/VIET OM1 x 2 on 9/13/23, with residual RCA disease, now awaiting staged PCI of mRCA on Mon 9/18/23    Problems discussed and associated plan:    #STEMI  - ST elevation  in leads I and aVL on admission  - Trops were WNL on admission  - Now s/p PCI of OM1 with balloon angioplasty s/p VIET x 2  - Trops downtrending 0.61->0.61->0.46  - No longer having CP  - c/w asa 81qd, brilinta 90mg bid ($30 co-pay per month), lipitor 80mg qhs  - continue lopressor 25 BID, losartan 25mg qd  - TTE 9/14: EF 50-55%, G1DD  - plan for staged PCI to RCA on Mon 9/18  - Will need f/u appt with Dr. Womack in 1-2 wks of discharge    #Hypothyroidism  - Latest TSH 2.2  - Continue levothyroxine 75    #Neuropathy  #Back pain  - Patient states that she has had this for a while, and receives injections in her back for it  - Outpatient care  - HbA1c 5.9%    #Diet: Regular DASH/TLC  #GI PPx: currently none indicated, only on laxatives  #DVT PPx: enoxaparin 40 QD  #MSK: PT following      Please contact me with any questions or concerns at [phone]. ASSESMENT:   83y F with PMHx of hypothyroidism, back pain and peripheral neuropathy, who presented with non-exertional chest pain, admitted for STEMI, s/p PCI/VIET OM1 x 2 on 9/13/23, with residual RCA disease, now awaiting staged PCI of mRCA on Mon 9/18/23    Problems discussed and associated plan:    #STEMI  - ST elevation  in leads I and aVL on admission  - Trops were WNL on admission  - Now s/p PCI of OM1 with balloon angioplasty s/p VIET x 2  - Trops downtrending 0.61->0.61->0.46  - No longer having CP  - c/w asa 81qd, brilinta 90mg bid ($30 co-pay per month), lipitor 80mg qhs  - continue lopressor 25 BID, losartan 25mg qd  - TTE 9/14: EF 50-55%, G1DD  - plan for staged PCI to RCA on Mon 9/18  - Will need f/u appt with Dr. Womack in 1-2 wks of discharge    #Hypothyroidism  - Latest TSH 2.2  - Continue levothyroxine 75    #Neuropathy  #Back pain  - Patient states that she has had this for a while, and receives injections in her back for it  - Outpatient care  - HbA1c 5.9%    #Diet: Regular DASH/TLC  #GI PPx: added PPI  #DVT PPx: enoxaparin 40 QD (hold day of cath)  #MSK: PT following      Please contact me with any questions or concerns at [phone]. ASSESSMENT:   83y F with PMHx of hypothyroidism, back pain and peripheral neuropathy, who presented with non-exertional chest pain, admitted for STEMI, s/p PCI/VIET OM1 x 2 on 9/13/23, with residual RCA disease, now awaiting staged PCI of mRCA on Mon 9/18/23    Problems discussed and associated plan:    #STEMI  - ST elevation  in leads I and aVL on admission  - Trops were WNL on admission  - Now s/p PCI of OM1 with balloon angioplasty s/p VIET x 2  - Trops downtrending 0.61->0.61->0.46  - No longer having CP  - c/w asa 81qd, brilinta 90mg bid ($30 co-pay per month), lipitor 80mg qhs  - continue lopressor 25 BID, losartan 25mg qd  - TTE 9/14: EF 50-55%, G1DD  - plan for staged PCI to RCA on Mon 9/18  - Will need f/u appt with Dr. Womack in 1-2 wks of discharge    #Hypothyroidism  - Latest TSH 2.2  - Continue levothyroxine 75    #Neuropathy  #Back pain  - Patient states that she has had this for a while, and receives injections in her back for it  - Outpatient care  - HbA1c 5.9%    #Diet: Regular DASH/TLC  #GI PPx: added PPI  #DVT PPx: enoxaparin 40 QD (hold day of cath)  #MSK: PT following      Please contact me with any questions or concerns at [phone].

## 2023-09-17 LAB
ANION GAP SERPL CALC-SCNC: 10 MMOL/L — SIGNIFICANT CHANGE UP (ref 7–14)
ANION GAP SERPL CALC-SCNC: 13 MMOL/L — SIGNIFICANT CHANGE UP (ref 7–14)
APTT BLD: 33.4 SEC — SIGNIFICANT CHANGE UP (ref 27–39.2)
BUN SERPL-MCNC: 18 MG/DL — SIGNIFICANT CHANGE UP (ref 10–20)
BUN SERPL-MCNC: 25 MG/DL — HIGH (ref 10–20)
CALCIUM SERPL-MCNC: 9.2 MG/DL — SIGNIFICANT CHANGE UP (ref 8.4–10.5)
CALCIUM SERPL-MCNC: 9.3 MG/DL — SIGNIFICANT CHANGE UP (ref 8.4–10.5)
CHLORIDE SERPL-SCNC: 100 MMOL/L — SIGNIFICANT CHANGE UP (ref 98–110)
CHLORIDE SERPL-SCNC: 106 MMOL/L — SIGNIFICANT CHANGE UP (ref 98–110)
CO2 SERPL-SCNC: 23 MMOL/L — SIGNIFICANT CHANGE UP (ref 17–32)
CO2 SERPL-SCNC: 24 MMOL/L — SIGNIFICANT CHANGE UP (ref 17–32)
CREAT SERPL-MCNC: 0.8 MG/DL — SIGNIFICANT CHANGE UP (ref 0.7–1.5)
CREAT SERPL-MCNC: 1 MG/DL — SIGNIFICANT CHANGE UP (ref 0.7–1.5)
EGFR: 56 ML/MIN/1.73M2 — LOW
EGFR: 73 ML/MIN/1.73M2 — SIGNIFICANT CHANGE UP
GLUCOSE BLDC GLUCOMTR-MCNC: 104 MG/DL — HIGH (ref 70–99)
GLUCOSE BLDC GLUCOMTR-MCNC: 112 MG/DL — HIGH (ref 70–99)
GLUCOSE BLDC GLUCOMTR-MCNC: 97 MG/DL — SIGNIFICANT CHANGE UP (ref 70–99)
GLUCOSE SERPL-MCNC: 113 MG/DL — HIGH (ref 70–99)
GLUCOSE SERPL-MCNC: 94 MG/DL — SIGNIFICANT CHANGE UP (ref 70–99)
HCT VFR BLD CALC: 41.5 % — SIGNIFICANT CHANGE UP (ref 37–47)
HCT VFR BLD CALC: 41.9 % — SIGNIFICANT CHANGE UP (ref 37–47)
HGB BLD-MCNC: 12.9 G/DL — SIGNIFICANT CHANGE UP (ref 12–16)
HGB BLD-MCNC: 13.2 G/DL — SIGNIFICANT CHANGE UP (ref 12–16)
INR BLD: 1.04 RATIO — SIGNIFICANT CHANGE UP (ref 0.65–1.3)
MAGNESIUM SERPL-MCNC: 2.1 MG/DL — SIGNIFICANT CHANGE UP (ref 1.8–2.4)
MAGNESIUM SERPL-MCNC: 2.2 MG/DL — SIGNIFICANT CHANGE UP (ref 1.8–2.4)
MCHC RBC-ENTMCNC: 28.3 PG — SIGNIFICANT CHANGE UP (ref 27–31)
MCHC RBC-ENTMCNC: 28.5 PG — SIGNIFICANT CHANGE UP (ref 27–31)
MCHC RBC-ENTMCNC: 31.1 G/DL — LOW (ref 32–37)
MCHC RBC-ENTMCNC: 31.5 G/DL — LOW (ref 32–37)
MCV RBC AUTO: 89.9 FL — SIGNIFICANT CHANGE UP (ref 81–99)
MCV RBC AUTO: 91.6 FL — SIGNIFICANT CHANGE UP (ref 81–99)
NRBC # BLD: 0 /100 WBCS — SIGNIFICANT CHANGE UP (ref 0–0)
NRBC # BLD: 0 /100 WBCS — SIGNIFICANT CHANGE UP (ref 0–0)
PLATELET # BLD AUTO: 212 K/UL — SIGNIFICANT CHANGE UP (ref 130–400)
PLATELET # BLD AUTO: 286 K/UL — SIGNIFICANT CHANGE UP (ref 130–400)
PMV BLD: 10.6 FL — HIGH (ref 7.4–10.4)
PMV BLD: 11 FL — HIGH (ref 7.4–10.4)
POTASSIUM SERPL-MCNC: 4.3 MMOL/L — SIGNIFICANT CHANGE UP (ref 3.5–5)
POTASSIUM SERPL-MCNC: 4.4 MMOL/L — SIGNIFICANT CHANGE UP (ref 3.5–5)
POTASSIUM SERPL-SCNC: 4.3 MMOL/L — SIGNIFICANT CHANGE UP (ref 3.5–5)
POTASSIUM SERPL-SCNC: 4.4 MMOL/L — SIGNIFICANT CHANGE UP (ref 3.5–5)
PROTHROM AB SERPL-ACNC: 11.9 SEC — SIGNIFICANT CHANGE UP (ref 9.95–12.87)
RBC # BLD: 4.53 M/UL — SIGNIFICANT CHANGE UP (ref 4.2–5.4)
RBC # BLD: 4.66 M/UL — SIGNIFICANT CHANGE UP (ref 4.2–5.4)
RBC # FLD: 14.3 % — SIGNIFICANT CHANGE UP (ref 11.5–14.5)
RBC # FLD: 14.5 % — SIGNIFICANT CHANGE UP (ref 11.5–14.5)
SODIUM SERPL-SCNC: 134 MMOL/L — LOW (ref 135–146)
SODIUM SERPL-SCNC: 142 MMOL/L — SIGNIFICANT CHANGE UP (ref 135–146)
WBC # BLD: 12.99 K/UL — HIGH (ref 4.8–10.8)
WBC # BLD: 15.74 K/UL — HIGH (ref 4.8–10.8)
WBC # FLD AUTO: 12.99 K/UL — HIGH (ref 4.8–10.8)
WBC # FLD AUTO: 15.74 K/UL — HIGH (ref 4.8–10.8)

## 2023-09-17 PROCEDURE — 93010 ELECTROCARDIOGRAM REPORT: CPT

## 2023-09-17 PROCEDURE — 99232 SBSQ HOSP IP/OBS MODERATE 35: CPT

## 2023-09-17 RX ORDER — SODIUM CHLORIDE 9 MG/ML
1000 INJECTION INTRAMUSCULAR; INTRAVENOUS; SUBCUTANEOUS
Refills: 0 | Status: DISCONTINUED | OUTPATIENT
Start: 2023-09-17 | End: 2023-09-18

## 2023-09-17 RX ORDER — METOPROLOL TARTRATE 50 MG
12.5 TABLET ORAL
Refills: 0 | Status: DISCONTINUED | OUTPATIENT
Start: 2023-09-17 | End: 2023-09-19

## 2023-09-17 RX ADMIN — TICAGRELOR 90 MILLIGRAM(S): 90 TABLET ORAL at 06:01

## 2023-09-17 RX ADMIN — TICAGRELOR 90 MILLIGRAM(S): 90 TABLET ORAL at 17:20

## 2023-09-17 RX ADMIN — Medication 12.5 MILLIGRAM(S): at 17:20

## 2023-09-17 RX ADMIN — SENNA PLUS 2 TABLET(S): 8.6 TABLET ORAL at 21:45

## 2023-09-17 RX ADMIN — Medication 30 MILLILITER(S): at 11:47

## 2023-09-17 RX ADMIN — ENOXAPARIN SODIUM 40 MILLIGRAM(S): 100 INJECTION SUBCUTANEOUS at 06:03

## 2023-09-17 RX ADMIN — CHLORHEXIDINE GLUCONATE 1 APPLICATION(S): 213 SOLUTION TOPICAL at 06:11

## 2023-09-17 RX ADMIN — POLYETHYLENE GLYCOL 3350 17 GRAM(S): 17 POWDER, FOR SOLUTION ORAL at 06:03

## 2023-09-17 RX ADMIN — Medication 81 MILLIGRAM(S): at 11:47

## 2023-09-17 RX ADMIN — ATORVASTATIN CALCIUM 80 MILLIGRAM(S): 80 TABLET, FILM COATED ORAL at 21:45

## 2023-09-17 RX ADMIN — Medication 75 MICROGRAM(S): at 06:01

## 2023-09-17 RX ADMIN — PANTOPRAZOLE SODIUM 40 MILLIGRAM(S): 20 TABLET, DELAYED RELEASE ORAL at 06:02

## 2023-09-17 RX ADMIN — Medication 25 MILLIGRAM(S): at 06:02

## 2023-09-17 RX ADMIN — LOSARTAN POTASSIUM 25 MILLIGRAM(S): 100 TABLET, FILM COATED ORAL at 06:01

## 2023-09-17 NOTE — PROGRESS NOTE ADULT - SUBJECTIVE AND OBJECTIVE BOX
Chief complaint: Patient is a 83y old  Female who presents with a chief complaint of ST elevation myocardial infarction (STEMI)        Interval history:  Pt denies any chest pain, SOB, or palpitations  c/o dyspepsia    Review of systems: A complete 10-point review of systems was obtained and is negative except as stated in the interval history.    Vitals:  T(C): 36.7 (17 Sep 2023 07:31), Max: 37 (16 Sep 2023 23:25)  T(F): 98 (17 Sep 2023 07:31), Max: 98.6 (16 Sep 2023 23:25)  HR: 60 (17 Sep 2023 07:31) (60 - 75)  BP: 123/58 (17 Sep 2023 07:31) (111/80 - 154/70)  BP(mean): 83 (17 Sep 2023 07:31) (83 - 101)    RR: 20 (17 Sep 2023 07:31) (16 - 24)  SpO2: 98% (17 Sep 2023 07:31) (98% - 100%)    O2 Parameters below as of 17 Sep 2023 07:31  Patient On (Oxygen Delivery Method): room air      Ins & outs:     I&O's Summary    16 Sep 2023 07:01  -  17 Sep 2023 07:00  --------------------------------------------------------  IN: 890 mL / OUT: 550 mL / NET: 340 mL    17 Sep 2023 07:01  -  17 Sep 2023 11:39  --------------------------------------------------------  IN: 230 mL / OUT: 250 mL / NET: -20 mL        Weight trend:  Weight (kg): 81.5 (09-16)    Physical exam:  General: No apparent distress  HEENT: Anicteric sclera. Moist mucous membranes. JVD -   Cardiac: Regular rate and rhythm. No murmurs, rubs, or gallops.   Vascular: Symmetric radial pulses. Dorsalis pedis pulses palpable.   Respiratory: Normal effort. Clear to ascultation.   Abdomen: Soft, nontender. Audible bowel sounds.   Extremities: Warm with no edema. No cyanosis or clubbing.   Right Radial site: +ecchymosis, no hematoma, 2+ Radial   Skin: Warm and dry. No rash.   Neurologic: Grossly normal motor function.   Psychiatric: Oriented to person, place, and time.     Data reviewed:  - Telemetry:  67-88    - ECG (9/15/23):   Ventricular Rate 74 BPM    Atrial Rate 74 BPM    P-R Interval 178 ms    QRS Duration 62 ms    Q-T Interval 432 ms    QTC Calculation(Bazett) 479 ms    P Axis 27 degrees    R Axis 8 degrees    T Axis 126 degrees    Diagnosis Line Normal sinus rhythm  T wave abnormality, consider anterolateral ischemia  Prolonged QT    - TTE (9/14/23):   Summary:   1. Left ventricular ejection fraction, by visual estimation, is 50 to   55%.   2. Mildly decreased global left ventricular systolic function.   3. Moderate left ventricular hypertrophy.   4. Spectral Doppler shows impaired relaxation pattern of left   ventricular myocardial filling (Grade I diastolic dysfunction).   5. Normal left atrial size.   6. Normal right atrial size.   7. No evidence of mitral valve regurgitation.   8. Endocardial visualization was enhanced with intravenous echo contrast.    - Chest x-ray (9/15/23):   Impression:    No radiographic evidence of acute cardiopulmonary disease.    - Cardiac catheterization 9/13/23:  Intervention:  Successful PCI of OM1 with balloon angioplasty s/p VIET x 2    Implants:  [place] 2.5 x 15 mm, [place] 2.75 x 22 mm to OM1                   FINDINGS:   Coronary Dominance: Right  LM: Mild disease diffusely  LAD: Mild disease diffusely  D1: Diffusely disease  CX: Proximal segment 70% stenosis, distal segment 50% stenosis  OM1: Two segments with focal stenosis - 90% and 95%  OM2: Diffuse mild disease  RCA: Mid segment 90% stenosis, distal segment mild disease  RPDA: severe disease, small      - Labs:                        12.9   12.99 )-----------( 212      ( 17 Sep 2023 05:39 )             41.5     09-17    142  |  106  |  18  ----------------------------<  94  4.3   |  23  |  0.8    Ca    9.3      17 Sep 2023 05:39  Mg     2.1     09-17    TPro  6.2  /  Alb  3.8  /  TBili  0.3  /  DBili  x   /  AST  26  /  ALT  11  /  AlkPhos  69  09-16    LIVER FUNCTIONS - ( 16 Sep 2023 05:40 )  Alb: 3.8 g/dL / Pro: 6.2 g/dL / ALK PHOS: 69 U/L / ALT: 11 U/L / AST: 26 U/L / GGT: x             CARDIAC MARKERS ( 16 Sep 2023 05:40 )  x     / 0.46 ng/mL / x     / x     / x          Lactate Trend    Urinalysis Basic - ( 17 Sep 2023 05:39 )    Color: x / Appearance: x / SG: x / pH: x  Gluc: 94 mg/dL / Ketone: x  / Bili: x / Urobili: x   Blood: x / Protein: x / Nitrite: x   Leuk Esterase: x / RBC: x / WBC x   Sq Epi: x / Non Sq Epi: x / Bacteria: x             Medications:  aspirin enteric coated 81 milliGRAM(s) Oral daily  atorvastatin 80 milliGRAM(s) Oral at bedtime  chlorhexidine 2% Cloths 1 Application(s) Topical <User Schedule>  enoxaparin Injectable 40 milliGRAM(s) SubCutaneous every 24 hours  influenza  Vaccine (HIGH DOSE) 0.7 milliLiter(s) IntraMuscular once  levothyroxine 75 MICROGram(s) Oral daily  losartan 25 milliGRAM(s) Oral daily  metoprolol tartrate 25 milliGRAM(s) Oral two times a day  ondansetron    Tablet 4 milliGRAM(s) Oral once  pantoprazole    Tablet 40 milliGRAM(s) Oral before breakfast  polyethylene glycol 3350 17 Gram(s) Oral two times a day  senna 2 Tablet(s) Oral at bedtime  ticagrelor 90 milliGRAM(s) Oral every 12 hours         Chief complaint: Patient is a 83y old  Female who presents with a chief complaint of ST elevation myocardial infarction (STEMI)    Interval history:  Pt denies any chest pain, SOB, or palpitations  c/o dyspepsia    Review of systems: A complete 10-point review of systems was obtained and is negative except as stated in the interval history.    Vitals:  T(C): 36.7 (17 Sep 2023 07:31), Max: 37 (16 Sep 2023 23:25)  T(F): 98 (17 Sep 2023 07:31), Max: 98.6 (16 Sep 2023 23:25)  HR: 60 (17 Sep 2023 07:31) (60 - 75)  BP: 123/58 (17 Sep 2023 07:31) (111/80 - 154/70)  BP(mean): 83 (17 Sep 2023 07:31) (83 - 101)    RR: 20 (17 Sep 2023 07:31) (16 - 24)  SpO2: 98% (17 Sep 2023 07:31) (98% - 100%)    O2 Parameters below as of 17 Sep 2023 07:31  Patient On (Oxygen Delivery Method): room air      Ins & outs:     I&O's Summary    16 Sep 2023 07:01  -  17 Sep 2023 07:00  --------------------------------------------------------  IN: 890 mL / OUT: 550 mL / NET: 340 mL    17 Sep 2023 07:01  -  17 Sep 2023 11:39  --------------------------------------------------------  IN: 230 mL / OUT: 250 mL / NET: -20 mL        Weight trend:  Weight (kg): 81.5 (09-16)    Physical exam:  General: No apparent distress  HEENT: Anicteric sclera. Moist mucous membranes. JVD -   Cardiac: Regular rate and rhythm. No murmurs, rubs, or gallops.   Vascular: Symmetric radial pulses. Dorsalis pedis pulses palpable.   Respiratory: Normal effort. Clear to ascultation.   Abdomen: Soft, nontender. Audible bowel sounds.   Extremities: Warm with no edema. No cyanosis or clubbing.   Right Radial site: +ecchymosis, no hematoma, 2+ Radial   Skin: Warm and dry. No rash.   Neurologic: Grossly normal motor function.   Psychiatric: Oriented to person, place, and time.     Data reviewed:  - Telemetry:  67-88    - ECG (9/15/23):   Ventricular Rate 74 BPM    Atrial Rate 74 BPM    P-R Interval 178 ms    QRS Duration 62 ms    Q-T Interval 432 ms    QTC Calculation(Bazett) 479 ms    P Axis 27 degrees    R Axis 8 degrees    T Axis 126 degrees    Diagnosis Line Normal sinus rhythm  T wave abnormality, consider anterolateral ischemia  Prolonged QT    - TTE (9/14/23):   Summary:   1. Left ventricular ejection fraction, by visual estimation, is 50 to   55%.   2. Mildly decreased global left ventricular systolic function.   3. Moderate left ventricular hypertrophy.   4. Spectral Doppler shows impaired relaxation pattern of left   ventricular myocardial filling (Grade I diastolic dysfunction).   5. Normal left atrial size.   6. Normal right atrial size.   7. No evidence of mitral valve regurgitation.   8. Endocardial visualization was enhanced with intravenous echo contrast.    - Chest x-ray (9/15/23):   Impression:    No radiographic evidence of acute cardiopulmonary disease.    - Cardiac catheterization 9/13/23:  Intervention:  Successful PCI of OM1 with balloon angioplasty s/p VIET x 2    Implants:  [place] 2.5 x 15 mm, [place] 2.75 x 22 mm to OM1                   FINDINGS:   Coronary Dominance: Right  LM: Mild disease diffusely  LAD: Mild disease diffusely  D1: Diffusely disease  CX: Proximal segment 70% stenosis, distal segment 50% stenosis  OM1: Two segments with focal stenosis - 90% and 95%  OM2: Diffuse mild disease  RCA: Mid segment 90% stenosis, distal segment mild disease  RPDA: severe disease, small      - Labs:                        12.9   12.99 )-----------( 212      ( 17 Sep 2023 05:39 )             41.5     09-17    142  |  106  |  18  ----------------------------<  94  4.3   |  23  |  0.8    Ca    9.3      17 Sep 2023 05:39  Mg     2.1     09-17    TPro  6.2  /  Alb  3.8  /  TBili  0.3  /  DBili  x   /  AST  26  /  ALT  11  /  AlkPhos  69  09-16    LIVER FUNCTIONS - ( 16 Sep 2023 05:40 )  Alb: 3.8 g/dL / Pro: 6.2 g/dL / ALK PHOS: 69 U/L / ALT: 11 U/L / AST: 26 U/L / GGT: x             CARDIAC MARKERS ( 16 Sep 2023 05:40 )  x     / 0.46 ng/mL / x     / x     / x          Lactate Trend    Urinalysis Basic - ( 17 Sep 2023 05:39 )    Color: x / Appearance: x / SG: x / pH: x  Gluc: 94 mg/dL / Ketone: x  / Bili: x / Urobili: x   Blood: x / Protein: x / Nitrite: x   Leuk Esterase: x / RBC: x / WBC x   Sq Epi: x / Non Sq Epi: x / Bacteria: x             Medications:  aspirin enteric coated 81 milliGRAM(s) Oral daily  atorvastatin 80 milliGRAM(s) Oral at bedtime  chlorhexidine 2% Cloths 1 Application(s) Topical <User Schedule>  enoxaparin Injectable 40 milliGRAM(s) SubCutaneous every 24 hours  influenza  Vaccine (HIGH DOSE) 0.7 milliLiter(s) IntraMuscular once  levothyroxine 75 MICROGram(s) Oral daily  losartan 25 milliGRAM(s) Oral daily  metoprolol tartrate 25 milliGRAM(s) Oral two times a day  ondansetron    Tablet 4 milliGRAM(s) Oral once  pantoprazole    Tablet 40 milliGRAM(s) Oral before breakfast  polyethylene glycol 3350 17 Gram(s) Oral two times a day  senna 2 Tablet(s) Oral at bedtime  ticagrelor 90 milliGRAM(s) Oral every 12 hours

## 2023-09-17 NOTE — PROGRESS NOTE ADULT - ASSESSMENT
ASSESSMENT:   83y F with PMHx of hypothyroidism, back pain and peripheral neuropathy, who presented with non-exertional chest pain, admitted for STEMI, s/p PCI/VIET OM1 x 2 on 9/13/23, with residual RCA disease, now awaiting staged PCI of mRCA on Mon 9/18/23    Problems discussed and associated plan:    #STEMI -  ST elevation  in leads I and aVL on admission. Trops were WNL on admission  # CAD   -9/13 s/p PCI of OM1 with balloon angioplasty s/p VIET x 2  - Trops downtrending 0.61->0.61->0.46  - No longer having CP  - c/w asa 81qd, brilinta 90mg bid ($30 co-pay per month), lipitor 80mg qhs  - continue lopressor 25 BID, losartan 25mg qd  - TTE 9/14: EF 50-55%, G1DD  - plan for staged PCI to RCA on Mon 9/18  - Will need f/u appt with Dr. Womack in 1-2 wks of discharge    #Hypothyroidism  - Latest TSH 2.2  - Continue levothyroxine 75    #Neuropathy  #Back pain  - Patient states that she has had this for a while, and receives injections in her back for it  - Outpatient care  - HbA1c 5.9%    #Diet: Regular DASH/TLC  #GI PPx: added PPI and maalox as needed   #DVT PPx: enoxaparin 40 QD (hold day of cath)  #MSK: PT following      Please contact me with any questions or concerns at [phone]. ASSESSMENT:   83y F with PMHx of hypothyroidism, back pain and peripheral neuropathy, who presented with non-exertional chest pain, admitted for STEMI, s/p PCI/VIET OM1 x 2 on 9/13/23, with residual RCA disease, now awaiting staged PCI of mRCA on Mon 9/18/23.    Problems discussed and associated plan:    #STEMI -  ST elevation  in leads I and aVL on admission. Trops were WNL on admission  # CAD   -9/13 s/p PCI of OM1 with balloon angioplasty s/p VIET x 2  - Trops downtrending 0.61->0.61->0.46  - No longer having CP  - c/w asa 81qd, brilinta 90mg bid ($30 co-pay per month), lipitor 80mg qhs  - continue lopressor 25 BID, losartan 25mg qd  - TTE 9/14: EF 50-55%, G1DD  - plan for staged PCI to RCA on Mon 9/18  - Will need f/u appt with Dr. Womack in 1-2 wks of discharge    #Hypothyroidism  - Latest TSH 2.2  - Continue levothyroxine 75    #Neuropathy  #Back pain  - Patient states that she has had this for a while, and receives injections in her back for it  - Outpatient care  - HbA1c 5.9%    #Diet: Regular DASH/TLC  #GI PPx: added PPI and maalox as needed   #DVT PPx: enoxaparin 40 QD (hold day of cath)  #MSK: PT following      Please contact me with any questions or concerns at [phone].

## 2023-09-18 ENCOUNTER — TRANSCRIPTION ENCOUNTER (OUTPATIENT)
Age: 83
End: 2023-09-18

## 2023-09-18 LAB
GLUCOSE BLDC GLUCOMTR-MCNC: 109 MG/DL — HIGH (ref 70–99)
GLUCOSE BLDC GLUCOMTR-MCNC: 134 MG/DL — HIGH (ref 70–99)

## 2023-09-18 PROCEDURE — 99233 SBSQ HOSP IP/OBS HIGH 50: CPT

## 2023-09-18 PROCEDURE — 92928 PRQ TCAT PLMT NTRAC ST 1 LES: CPT | Mod: RC

## 2023-09-18 PROCEDURE — 93010 ELECTROCARDIOGRAM REPORT: CPT

## 2023-09-18 RX ORDER — SODIUM CHLORIDE 9 MG/ML
1000 INJECTION INTRAMUSCULAR; INTRAVENOUS; SUBCUTANEOUS
Refills: 0 | Status: DISCONTINUED | OUTPATIENT
Start: 2023-09-18 | End: 2023-09-19

## 2023-09-18 RX ADMIN — Medication 12.5 MILLIGRAM(S): at 05:26

## 2023-09-18 RX ADMIN — Medication 12.5 MILLIGRAM(S): at 17:29

## 2023-09-18 RX ADMIN — LOSARTAN POTASSIUM 25 MILLIGRAM(S): 100 TABLET, FILM COATED ORAL at 05:39

## 2023-09-18 RX ADMIN — CHLORHEXIDINE GLUCONATE 1 APPLICATION(S): 213 SOLUTION TOPICAL at 05:38

## 2023-09-18 RX ADMIN — ATORVASTATIN CALCIUM 80 MILLIGRAM(S): 80 TABLET, FILM COATED ORAL at 22:16

## 2023-09-18 RX ADMIN — Medication 75 MICROGRAM(S): at 05:26

## 2023-09-18 RX ADMIN — PANTOPRAZOLE SODIUM 40 MILLIGRAM(S): 20 TABLET, DELAYED RELEASE ORAL at 05:26

## 2023-09-18 RX ADMIN — SODIUM CHLORIDE 75 MILLILITER(S): 9 INJECTION INTRAMUSCULAR; INTRAVENOUS; SUBCUTANEOUS at 00:33

## 2023-09-18 RX ADMIN — TICAGRELOR 90 MILLIGRAM(S): 90 TABLET ORAL at 05:25

## 2023-09-18 RX ADMIN — Medication 81 MILLIGRAM(S): at 07:44

## 2023-09-18 RX ADMIN — TICAGRELOR 90 MILLIGRAM(S): 90 TABLET ORAL at 17:32

## 2023-09-18 RX ADMIN — SODIUM CHLORIDE 150 MILLILITER(S): 9 INJECTION INTRAMUSCULAR; INTRAVENOUS; SUBCUTANEOUS at 10:45

## 2023-09-18 NOTE — DISCHARGE NOTE PROVIDER - CARE PROVIDERS DIRECT ADDRESSES
[email or web address]

## 2023-09-18 NOTE — DISCHARGE NOTE PROVIDER - CARE PROVIDER_API CALL
Maikel Womack  Interventional Cardiology  [address] [place] [address], [address]  [place] [address]  Phone: [phone]  Fax: [phone]  Follow Up Time: 2 weeks

## 2023-09-18 NOTE — DISCHARGE NOTE PROVIDER - NSDCCPTREATMENT_GEN_ALL_CORE_FT
PRINCIPAL PROCEDURE  Procedure: Coronary angiogram  Findings and Treatment: - Please take aspirin 81 mg daily and Brilinta, unless directed by your Cardiologist.  - You should restart your Eliquis*** on ***.  - Please do not take metformin. You can restart this medication on *** in the morning/evening.   - Do not drive or operate heavy machinery for 24 hours.  - After 24 hours, you may shower and remove the dressing from the site.  - Avoid using affected arm for 24 hours.  - No heavy lifting (objects more than 5 lbs) for 1 week.  - Do not bathe or swim for 1 week.   - Do not rub or apply lotion, cream, or powder to the affected site. Leave it open to the air.   - Any sudden swelling, redness, fever, discharge, or severe pain, call your Cardiologist or call the Catheterization Lab at [phone].  - If there is bleeding from the puncture site, apply direct firm pressure on the site and call [phone].     PRINCIPAL PROCEDURE  Procedure: Coronary angiogram  Findings and Treatment: - Please take aspirin 81 mg daily and Brilinta, unless directed by your Cardiologist.  - Do not drive or operate heavy machinery for 24 hours.  - After 24 hours, you may shower and remove the dressing from the site.  - Avoid using affected arm for 24 hours.  - No heavy lifting (objects more than 5 lbs) for 1 week.  - Do not bathe or swim for 1 week.   - Do not rub or apply lotion, cream, or powder to the affected site. Leave it open to the air.   - Any sudden swelling, redness, fever, discharge, or severe pain, call your Cardiologist or call the Catheterization Lab at [phone].  - If there is bleeding from the puncture site, apply direct firm pressure on the site and call [phone].

## 2023-09-18 NOTE — PROGRESS NOTE ADULT - NS ATTEND AMEND GEN_ALL_CORE FT
pt seen and examined  events noted/chart reviewed  ILMI, s/p pci of om 1  cont ccu care  GDMR for cad/ami  will need staged pci of rca in the near future
Impression:    Presented with Acute LW STEMI  2v CAD s/p PCI OM1 on 9/13/2023    Hemodynamically stable  No complaints    Plan:  - Continue DAPT / high-intensity statin  - Staged PCI RCA scheduled on Monday 9/18  - NPO after midnight
In brief, Ms. Jones is a 83-year-old woman with history of hypothyroidism who was admitted for a STEMI on 9/13/23 with primary revascularization of an OM1 with VIET. She had residual RCA disease and is planned for staged PCI today.    TTE: EF 50-55%, mod LVH, G1DD, no significant valvular disease    Trop peak 0.61    A1c 5.9%  TSH 2.2  Cr 0.8, K 4.3    Plan:  - Staged PCI today  - DAPT  - Statin
Impression:    2v CAD s/p PCI OM1 on 9/13/2023  Staged PCI RCA scheduled on Monday 9/18    Hemodynamically stable  No complaints    Plan:  - Continue DAPT / high-intensity statin  - NPO after midnight on Sunday
pt seen and examined  events noted  staged pci discussed  ambulate as tolerated  cont all GDMR

## 2023-09-18 NOTE — DISCHARGE NOTE PROVIDER - NSDCMRMEDTOKEN_GEN_ALL_CORE_FT
levothyroxine 75 mcg (0.075 mg) oral tablet: 1 orally once a day  ticagrelor 90 mg oral tablet: 1 tab(s) orally 2 times a day MDD: 2   aspirin 81 mg oral delayed release tablet: 1 tab(s) orally once a day  atorvastatin 80 mg oral tablet: 1 tab(s) orally once a day (at bedtime)  levothyroxine 75 mcg (0.075 mg) oral tablet: 1 orally once a day  losartan 25 mg oral tablet: 1 tab(s) orally once a day  metoprolol succinate 25 mg oral capsule, extended release: 1 cap(s) orally once a day  pantoprazole 40 mg oral delayed release tablet: 1 tab(s) orally once a day (before a meal)  ticagrelor 90 mg oral tablet: 1 tab(s) orally 2 times a day MDD: 2

## 2023-09-18 NOTE — PROGRESS NOTE ADULT - TIME BILLING
Chart review, bedside evaluation, coordination of care with the cath team, discussion of plan with the patient

## 2023-09-18 NOTE — PROGRESS NOTE ADULT - SUBJECTIVE AND OBJECTIVE BOX
Chief complaint: Patient is a 83y old  Female who presents with a chief complaint of ST elevation myocardial infarction (STEMI)    Interval history:  Pt in cath lab this morning. No acute events overnight.    Review of systems: A complete 10-point review of systems was obtained and is negative except as stated in the interval history.    Vitals:  Vital Signs Last 24 Hrs  T(C): 36.5 (18 Sep 2023 04:07), Max: 36.8 (17 Sep 2023 15:37)  T(F): 97.7 (18 Sep 2023 04:07), Max: 98.3 (17 Sep 2023 15:37)  HR: 71 (18 Sep 2023 04:07) (66 - 88)  BP: 119/58 (18 Sep 2023 04:07) (103/58 - 143/64)  BP(mean): 83 (18 Sep 2023 04:07) (77 - 92)  RR: 19 (18 Sep 2023 04:07) (19 - 20)  SpO2: 99% (18 Sep 2023 04:07) (96% - 99%)    Parameters below as of 18 Sep 2023 04:07  Patient On (Oxygen Delivery Method): room air        Ins & outs:     I&O's Summary    16 Sep 2023 07:01  -  17 Sep 2023 07:00  --------------------------------------------------------  IN: 890 mL / OUT: 550 mL / NET: 340 mL    17 Sep 2023 07:01  -  17 Sep 2023 11:39  --------------------------------------------------------  IN: 230 mL / OUT: 250 mL / NET: -20 mL        Weight trend:  Weight (kg): 81.5 (09-16)      Physical exam:  General: No apparent distress  HEENT: Anicteric sclera. Moist mucous membranes. JVD -   Cardiac: Regular rate and rhythm. No murmurs, rubs, or gallops.   Vascular: Symmetric radial pulses. Dorsalis pedis pulses palpable.   Respiratory: Normal effort. Clear to ascultation.   Abdomen: Soft, nontender. Audible bowel sounds.   Extremities: Warm with no edema. No cyanosis or clubbing.   Right Radial site: +ecchymosis, no hematoma, 2+ Radial   Skin: Warm and dry. No rash.   Neurologic: Grossly normal motor function.   Psychiatric: Oriented to person, place, and time.     Data reviewed:  - Telemetry:      - ECG (9/15/23):   Ventricular Rate 74 BPM    Atrial Rate 74 BPM    P-R Interval 178 ms    QRS Duration 62 ms    Q-T Interval 432 ms    QTC Calculation(Bazett) 479 ms    P Axis 27 degrees    R Axis 8 degrees    T Axis 126 degrees    Diagnosis Line Normal sinus rhythm  T wave abnormality, consider anterolateral ischemia  Prolonged QT    - TTE (9/14/23):   Summary:   1. Left ventricular ejection fraction, by visual estimation, is 50 to   55%.   2. Mildly decreased global left ventricular systolic function.   3. Moderate left ventricular hypertrophy.   4. Spectral Doppler shows impaired relaxation pattern of left   ventricular myocardial filling (Grade I diastolic dysfunction).   5. Normal left atrial size.   6. Normal right atrial size.   7. No evidence of mitral valve regurgitation.   8. Endocardial visualization was enhanced with intravenous echo contrast.    - Chest x-ray (9/15/23):   Impression:    No radiographic evidence of acute cardiopulmonary disease.    - Cardiac catheterization 9/13/23:  Intervention:  Successful PCI of OM1 with balloon angioplasty s/p VIET x 2    Implants:  [place] 2.5 x 15 mm, [place] 2.75 x 22 mm to OM1                   FINDINGS:   Coronary Dominance: Right  LM: Mild disease diffusely  LAD: Mild disease diffusely  D1: Diffusely disease  CX: Proximal segment 70% stenosis, distal segment 50% stenosis  OM1: Two segments with focal stenosis - 90% and 95%  OM2: Diffuse mild disease  RCA: Mid segment 90% stenosis, distal segment mild disease  RPDA: severe disease, small      - Labs:  - Telemetry:  - ECG (date***):  - Echo (date***):  - Radiology:    - Labs:                          13.2   15.74 )-----------( 286      ( 17 Sep 2023 20:18 )             41.9     09-17    134<L>  |  100  |  25<H>  ----------------------------<  113<H>  4.4   |  24  |  1.0    Ca    9.2      17 Sep 2023 20:18  Mg     2.2     09-17        PT/INR - ( 17 Sep 2023 20:18 )   PT: 11.90 sec;   INR: 1.04 ratio         PTT - ( 17 Sep 2023 20:18 )  PTT:33.4 sec          Lactate Trend    Urinalysis Basic - ( 17 Sep 2023 20:18 )    Color: x / Appearance: x / SG: x / pH: x  Gluc: 113 mg/dL / Ketone: x  / Bili: x / Urobili: x   Blood: x / Protein: x / Nitrite: x   Leuk Esterase: x / RBC: x / WBC x   Sq Epi: x / Non Sq Epi: x / Bacteria: x             Medications:  aspirin enteric coated 81 milliGRAM(s) Oral daily  atorvastatin 80 milliGRAM(s) Oral at bedtime  chlorhexidine 2% Cloths 1 Application(s) Topical <User Schedule>  enoxaparin Injectable 40 milliGRAM(s) SubCutaneous every 24 hours  influenza  Vaccine (HIGH DOSE) 0.7 milliLiter(s) IntraMuscular once  levothyroxine 75 MICROGram(s) Oral daily  losartan 25 milliGRAM(s) Oral daily  metoprolol tartrate 25 milliGRAM(s) Oral two times a day  ondansetron    Tablet 4 milliGRAM(s) Oral once  pantoprazole    Tablet 40 milliGRAM(s) Oral before breakfast  polyethylene glycol 3350 17 Gram(s) Oral two times a day  senna 2 Tablet(s) Oral at bedtime  ticagrelor 90 milliGRAM(s) Oral every 12 hours

## 2023-09-18 NOTE — PROGRESS NOTE ADULT - ASSESSMENT
ASSESSMENT:   83y F with PMHx of hypothyroidism, back pain and peripheral neuropathy, who presented with non-exertional chest pain, admitted for STEMI, s/p PCI/VIET OM1 x 2 on 9/13/23, with residual RCA disease, now awaiting staged PCI of mRCA on Mon 9/18/23.    Problems discussed and associated plan:    #STEMI -  ST elevation  in leads I and aVL on admission. Trops were WNL on admission  # CAD   -9/13 s/p PCI of OM1 with balloon angioplasty s/p VIET x 2  - Trops downtrending 0.61->0.61->0.46  - No longer having CP  - c/w asa 81qd, brilinta 90mg bid ($30 co-pay per month), lipitor 80mg qhs  - Lopressor decreased to 12.5mg bid due to bradycardia, losartan 25mg qd  - TTE 9/14: EF 50-55%, G1DD  - plan for staged PCI to RCA today  - Will need f/u appt with Dr. Womack in 1-2 wks of discharge    #Hypothyroidism  - Latest TSH 2.2  - Continue levothyroxine 75    #Neuropathy  #Back pain  - Patient states that she has had this for a while, and receives injections in her back for it  - Outpatient care  - HbA1c 5.9%    #Diet: Regular DASH/TLC  #GI PPx: added PPI and maalox as needed   #DVT PPx: enoxaparin 40 QD (hold day of cath)  #MSK: PT following      Please contact me with any questions or concerns at [phone].

## 2023-09-18 NOTE — CHART NOTE - NSCHARTNOTESELECT_GEN_ALL_CORE
Interventional Cardiology/Event Note
Post Cath Procedure Note
Transfer Note
Brief Cath Report/Event Note
STEMI/Event Note

## 2023-09-18 NOTE — DISCHARGE NOTE PROVIDER - ATTENDING DISCHARGE PHYSICAL EXAMINATION:
I saw and evaluated the patient the day of discharge.  They were admitted for STEMI with [facility] demonstrating severe 2-vessel disease. She underwent primary revascularization of OM1 with VIET and was admitted for volume optimization prior to successful staged PCI of the mid-RCA.  On the day of discharge they are hemodynamically stable, comfortable appearing, and without complaints.  All questions and concerns were addressed.  Patient is stable for discharge and close follow up with cardiology.

## 2023-09-18 NOTE — DISCHARGE NOTE PROVIDER - NSDCCPCAREPLAN_GEN_ALL_CORE_FT
PRINCIPAL DISCHARGE DIAGNOSIS  Diagnosis: STEMI (ST elevation myocardial infarction)  Assessment and Plan of Treatment:

## 2023-09-18 NOTE — DISCHARGE NOTE PROVIDER - YES NO FOR MLM POSITIVE OR NEGATIVE COVID RESULT
Medical Week 1 Survey    Flowsheet Row Responses   [facility] patient discharged from? [place]   Does the patient have one of the following disease processes/diagnoses(primary or secondary)? Other   Week 1 attempt successful? No   Unsuccessful attempts Attempt 1   Discharge diagnosis Perinephric abscess           MELONY AARON - Registered Nurse   ,

## 2023-09-18 NOTE — DISCHARGE NOTE PROVIDER - HOSPITAL COURSE
Patient is a 83y Female  with PMHx of hypothyroidism, back pain and peripheral neuropathy who presented to [facility] with non exertional chest pain and found to have inferolateral STEMI s/p PCI/VIET OM1 x 2 on 9/13/23, with residual RCA disease, now s/p LHC on 9/18 with VIET to mRCA. Patient was monitored overnight. On POD 1 patient remains HD stable with no complaints. Patient remains in SR with no arrythmias noted on tele. EKG performed showed no acute ST changes. Examination of right radial artery showed a C/DI site with no hematoma, erythema or bruit. Distal pulses are 2+ bilaterally. Renal function remains stable post cath. Patient will be discahrged home on DAPT with ASA and Brilinta (30.00 copay).  Patient is being DC home in stable conditon.        Intervention:   VIET PCI    Implants:    ERVIN FRONTIER RX [id number] to Mid RCA (AUC 7)    FINDINGS:     Coronary Dominance: Right dominate      LM: Mild diffuse disease    LAD: Mild disease prox LAD. Moderate diffuse disease D1    CX: Prox Lcx 70% stenosis. Distal Lcx 50% stenosis. Patent prior stent OM1    RCA: Mid RCA 90% stenosis. 50% stenosis of distal RCA. Diffuse severe disease in RPDA      Patient is a 82yo Female  with PMHx of hypothyroidism, back pain and peripheral neuropathy who presented to [facility] with non exertional chest pain and found to have inferolateral STEMI s/p PCI/VIET OM1 x 2 on 9/13/23, with residual RCA disease, now s/p LHC on 9/18 with VIET to mRCA. Patient was monitored overnight. On POD 1 patient remains HD stable with no complaints. Patient remains in SR with no arrhythmias noted on tele. EKG performed showed no acute ST changes. Examination of right radial artery showed a C/D/I site with no hematoma, erythema or bruit. Distal pulses are 2+ bilaterally. Renal function remains stable post cath. Patient will be discharged home on DAPT with ASA and Brilinta ($30.00 co-pay).  Patient is being DC home in stable condition.    Intervention: VIET PCI  Implants: ERVIN FRONTIER RX [id number] to Mid RCA (AUC 7)    FINDINGS:   Coronary Dominance: Right dominate  LM: Mild diffuse disease  LAD: Mild disease prox LAD. Moderate diffuse disease D1  CX: Prox Lcx 70% stenosis. Distal Lcx 50% stenosis. Patent prior stent OM1  RCA: Mid RCA 90% stenosis. 50% stenosis of distal RCA. Diffuse severe disease in RPDA      Patient is a 84yo Female  with PMHx of hypothyroidism, back pain and peripheral neuropathy who presented to [facility] with non exertional chest pain and found to have inferolateral STEMI s/p PCI/VIET OM1 x 2 on 9/13/23, with residual RCA disease, now s/p LHC on 9/18 with VIET to mRCA. Patient was monitored overnight. On POD 1 patient remains HD stable with no complaints. Patient remains in SR with no arrhythmias noted on tele. EKG performed showed no acute ST changes. Examination of right radial artery showed a C/D/I site with no hematoma, erythema or bruit. Distal pulses are 2+ bilaterally. Renal function remains stable post cath. Patient will be discharged home on DAPT with ASA and Brilinta ($30.00 co-pay).  Patient is being DC home in stable condition and will follow Dr. Womack (pending appointment).    Intervention: VIET PCI  Implants: ERVIN FRONTIER RX [id number] to Mid RCA (AUC 7)    FINDINGS:   Coronary Dominance: Right dominate  LM: Mild diffuse disease  LAD: Mild disease prox LAD. Moderate diffuse disease D1  CX: Prox Lcx 70% stenosis. Distal Lcx 50% stenosis. Patent prior stent OM1  RCA: Mid RCA 90% stenosis. 50% stenosis of distal RCA. Diffuse severe disease in RPDA      Patient is a 84yo Female  with PMHx of hypothyroidism, back pain and peripheral neuropathy who presented to [facility] with non exertional chest pain and found to have inferolateral STEMI s/p PCI/VIET OM1 x 2 on 9/13/23, with residual RCA disease, now s/p C on 9/18 with staged PCI with VIET to [facility]. Patient was monitored overnight. Patient remains in SR with no arrhythmias noted on tele. EKG performed showed no acute ST changes. Examination of right radial artery showed a C/D/I site with no hematoma, erythema or bruit. Distal pulses are 2+ bilaterally. Renal function remains stable post cath. Patient will be discharged home on DAPT with ASA and Brilinta ($30.00 co-pay).  Patient is being DC home in stable condition and will follow Dr. Womack (pending appointment).    [facility] 9/18/23:  FINDINGS:   Coronary Dominance: Right dominate  LM: Mild diffuse disease  LAD: Mild disease prox LAD. Moderate diffuse disease D1  CX: Prox Lcx 70% stenosis. Distal Lcx 50% stenosis. Patent prior stent OM1  RCA: Mid RCA 90% stenosis. 50% stenosis of distal RCA. Diffuse severe disease in RPDA    Intervention: VIET PCI  Implants: ERVIN FRONTIER RX [id number] to Mid RCA (AUC 7)

## 2023-09-18 NOTE — CHART NOTE - NSCHARTNOTEFT_GEN_A_CORE
PREOPERATIVE DAY OF PROCEDURE EVALUATION:  I have personally seen and examined the patient.  I agree with the history and physical which I have reviewed and noted any changes below.  (Signed electronically by __________)  09-18-23 @ 07:30      Cath Bleeding Risk: 5.1%    Prehydration: NS 75 ml/hr since midnight    83y F pmh hypothyroidism, back pain and peripheral neuropathy presented with chest pain on 9/13 to [facility] and was found to have a STEMI and transferred to [facility] where PCI was performed DESx 2 to OM1. 2VCAD was found on cath OM/RCA, patient presents today for staged intervention to RCA.     Right jennifer test: positive    ASA 81mg po given precath, Brilinta 90mg po given this AM PREOPERATIVE DAY OF PROCEDURE EVALUATION:  I have personally seen and examined the patient.  I agree with the history and physical which I have reviewed and noted any changes below.  (Signed electronically by __________)  09-18-23 @ 07:30      Cath Bleeding Risk: 5.1%    Prehydration: NS 75 ml/hr since midnight    83y F pmh hypothyroidism, back pain and peripheral neuropathy, + family h/o CAD (father and sibling with fatal MI) presented with chest pain on 9/13 to [facility] and was found to have a STEMI and transferred to [facility] where PCI was performed DESx 2 to OM1. 2VCAD was found on cath OM/RCA, patient presents today for staged intervention to RCA.     Right jennifer test: positive    ASA 81mg po given precath, Brilinta 90mg po given this AM

## 2023-09-18 NOTE — CHART NOTE - NSCHARTNOTEFT_GEN_A_CORE
PRE-OP DIAGNOSIS:  Staged PCI to RCA      PROCEDURE:     [X] Coronary Angiogram     [] LHC     [] LVG     [] RHC     [X] Intervention (see below)         PHYSICIAN:  Dr. Womack    ASSISTANT:  Dr.Ayad Karen       PROCEDURE DESCRIPTION:     Consent:      [X] Patient     [] Family Member     []  Used        Anesthesia:     [X] General     [] Sedation     [] Local        Access & Closure:     [X] 6Fr R Radial Artery     [] Fr Femoral Artery     [] Fr Femoral Vein     [] Fr Brachial Vein       IV Contrast: 75 mL        Intervention:   VIET PCI    Implants:    ERVIN FRONTIER RX [id number] to Mid RCA (AUC 7)    FINDINGS:     Coronary Dominance: Right dominate      LM: Mild diffuse disease    LAD: Mild disease. Moderate diffuse disease    CX: Prox Lcx 70% stenosis. Distal Lcx 50% stenosis. Patent prior stent OM1    RCA: Mid RCA 90% stenosis. 50% stenosis of distal RCA. Diffuse severe disease in RPDA     ESTIMATED BLOOD LOSS: < 10 mL        CONDITION:     [X] Good     [] Fair     [] Critical        SPECIMEN REMOVED: N/A       POST-OP DIAGNOSIS:      [] Normal Coronary Angiogram     [] Mild Coronary Artery Disease (< 50% stenosis)     [X] 2 Vessel Coronary Artery Disease to Lcx and RCA       PLAN OF CARE:     [] D/C Home Today     [X] Return to In-patient bed     [] Admit for observation     [] Return for Staged Procedure     [] CT Surgery Consult     [X] Medications: Aspirin, Brilinta, BB, and statin    [X] IV Fluids: NS 150cc/hr x 6hrs PRE-OP DIAGNOSIS:  Staged PCI to RCA      PROCEDURE:     [X] Coronary Angiogram     [] LHC     [] LVG     [] RHC     [X] Intervention (see below)         PHYSICIAN:  Dr. Womack    ASSISTANT:  Dr.Ayad Karen       PROCEDURE DESCRIPTION:     Consent:      [X] Patient     [] Family Member     []  Used        Anesthesia:     [X] General     [] Sedation     [] Local        Access & Closure:     [X] 6Fr R Radial Artery     [] Fr Femoral Artery     [] Fr Femoral Vein     [] Fr Brachial Vein       IV Contrast: 75 mL        Intervention:   VIET PCI    Implants:    ERVIN FRONTIER RX [id number] to Mid RCA (AUC 7)    FINDINGS:     Coronary Dominance: Right dominate      LM: Mild diffuse disease    LAD: Mild disease prox LAD. Moderate diffuse disease D1    CX: Prox Lcx 70% stenosis. Distal Lcx 50% stenosis. Patent prior stent OM1    RCA: Mid RCA 90% stenosis. 50% stenosis of distal RCA. Diffuse severe disease in RPDA     ESTIMATED BLOOD LOSS: < 10 mL        CONDITION:     [X] Good     [] Fair     [] Critical        SPECIMEN REMOVED: N/A       POST-OP DIAGNOSIS:      [] Normal Coronary Angiogram     [] Mild Coronary Artery Disease (< 50% stenosis)     [X] 2 Vessel Coronary Artery Disease to Lcx and RCA       PLAN OF CARE:     [] D/C Home Today     [X] Return to In-patient bed     [] Admit for observation     [] Return for Staged Procedure     [] CT Surgery Consult     [X] Medications: Aspirin, Brilinta, BB, and statin    [X] IV Fluids: NS 150cc/hr x 6hrs

## 2023-09-19 ENCOUNTER — TRANSCRIPTION ENCOUNTER (OUTPATIENT)
Age: 83
End: 2023-09-19

## 2023-09-19 VITALS
RESPIRATION RATE: 20 BRPM | OXYGEN SATURATION: 98 % | TEMPERATURE: 98 F | SYSTOLIC BLOOD PRESSURE: 121 MMHG | DIASTOLIC BLOOD PRESSURE: 58 MMHG | HEART RATE: 66 BPM

## 2023-09-19 PROBLEM — M54.16 RADICULOPATHY, LUMBAR REGION: Chronic | Status: ACTIVE | Noted: 2023-09-14

## 2023-09-19 PROBLEM — E03.9 HYPOTHYROIDISM, UNSPECIFIED: Chronic | Status: ACTIVE | Noted: 2023-09-13

## 2023-09-19 LAB
ANION GAP SERPL CALC-SCNC: 12 MMOL/L — SIGNIFICANT CHANGE UP (ref 7–14)
BUN SERPL-MCNC: 19 MG/DL — SIGNIFICANT CHANGE UP (ref 10–20)
CALCIUM SERPL-MCNC: 8.9 MG/DL — SIGNIFICANT CHANGE UP (ref 8.4–10.4)
CHLORIDE SERPL-SCNC: 106 MMOL/L — SIGNIFICANT CHANGE UP (ref 98–110)
CO2 SERPL-SCNC: 21 MMOL/L — SIGNIFICANT CHANGE UP (ref 17–32)
CREAT SERPL-MCNC: 0.7 MG/DL — SIGNIFICANT CHANGE UP (ref 0.7–1.5)
EGFR: 85 ML/MIN/1.73M2 — SIGNIFICANT CHANGE UP
GLUCOSE SERPL-MCNC: 107 MG/DL — HIGH (ref 70–99)
HCT VFR BLD CALC: 38.3 % — SIGNIFICANT CHANGE UP (ref 37–47)
HGB BLD-MCNC: 12 G/DL — SIGNIFICANT CHANGE UP (ref 12–16)
MAGNESIUM SERPL-MCNC: 2.1 MG/DL — SIGNIFICANT CHANGE UP (ref 1.8–2.4)
MCHC RBC-ENTMCNC: 28.1 PG — SIGNIFICANT CHANGE UP (ref 27–31)
MCHC RBC-ENTMCNC: 31.3 G/DL — LOW (ref 32–37)
MCV RBC AUTO: 89.7 FL — SIGNIFICANT CHANGE UP (ref 81–99)
NRBC # BLD: 0 /100 WBCS — SIGNIFICANT CHANGE UP (ref 0–0)
PLATELET # BLD AUTO: 218 K/UL — SIGNIFICANT CHANGE UP (ref 130–400)
PMV BLD: 10.9 FL — HIGH (ref 7.4–10.4)
POTASSIUM SERPL-MCNC: 4.2 MMOL/L — SIGNIFICANT CHANGE UP (ref 3.5–5)
POTASSIUM SERPL-SCNC: 4.2 MMOL/L — SIGNIFICANT CHANGE UP (ref 3.5–5)
RBC # BLD: 4.27 M/UL — SIGNIFICANT CHANGE UP (ref 4.2–5.4)
RBC # FLD: 14.3 % — SIGNIFICANT CHANGE UP (ref 11.5–14.5)
SODIUM SERPL-SCNC: 139 MMOL/L — SIGNIFICANT CHANGE UP (ref 135–146)
WBC # BLD: 11.19 K/UL — HIGH (ref 4.8–10.8)
WBC # FLD AUTO: 11.19 K/UL — HIGH (ref 4.8–10.8)

## 2023-09-19 PROCEDURE — 99239 HOSP IP/OBS DSCHRG MGMT >30: CPT

## 2023-09-19 PROCEDURE — 93010 ELECTROCARDIOGRAM REPORT: CPT

## 2023-09-19 RX ORDER — LOSARTAN POTASSIUM 100 MG/1
1 TABLET, FILM COATED ORAL
Qty: 0 | Refills: 0 | DISCHARGE
Start: 2023-09-19

## 2023-09-19 RX ORDER — TICAGRELOR 90 MG/1
1 TABLET ORAL
Qty: 60 | Refills: 3
Start: 2023-09-19 | End: 2024-01-16

## 2023-09-19 RX ORDER — ATORVASTATIN CALCIUM 80 MG/1
1 TABLET, FILM COATED ORAL
Qty: 30 | Refills: 0
Start: 2023-09-19 | End: 2023-10-18

## 2023-09-19 RX ORDER — METOPROLOL TARTRATE 50 MG
1 TABLET ORAL
Qty: 30 | Refills: 0
Start: 2023-09-19 | End: 2023-10-18

## 2023-09-19 RX ORDER — PANTOPRAZOLE SODIUM 20 MG/1
1 TABLET, DELAYED RELEASE ORAL
Qty: 30 | Refills: 0
Start: 2023-09-19 | End: 2023-10-18

## 2023-09-19 RX ORDER — ASPIRIN/CALCIUM CARB/MAGNESIUM 324 MG
1 TABLET ORAL
Qty: 30 | Refills: 0
Start: 2023-09-19 | End: 2023-10-18

## 2023-09-19 RX ADMIN — LOSARTAN POTASSIUM 25 MILLIGRAM(S): 100 TABLET, FILM COATED ORAL at 05:40

## 2023-09-19 RX ADMIN — Medication 12.5 MILLIGRAM(S): at 05:40

## 2023-09-19 RX ADMIN — TICAGRELOR 90 MILLIGRAM(S): 90 TABLET ORAL at 05:40

## 2023-09-19 RX ADMIN — Medication 75 MICROGRAM(S): at 05:40

## 2023-09-19 RX ADMIN — PANTOPRAZOLE SODIUM 40 MILLIGRAM(S): 20 TABLET, DELAYED RELEASE ORAL at 05:40

## 2023-09-19 RX ADMIN — Medication 81 MILLIGRAM(S): at 11:37

## 2023-09-19 RX ADMIN — CHLORHEXIDINE GLUCONATE 1 APPLICATION(S): 213 SOLUTION TOPICAL at 05:42

## 2023-09-19 NOTE — DISCHARGE NOTE NURSING/CASE MANAGEMENT/SOCIAL WORK - PATIENT PORTAL LINK FT
You can access the FollowMyHealth Patient Portal offered by [facility] by registering at the following website: [email or web address]. By joining [employer]’s FollowMyHealth portal, you will also be able to view your health information using other applications (apps) compatible with our system.

## 2023-09-20 ENCOUNTER — TRANSCRIPTION ENCOUNTER (OUTPATIENT)
Age: 83
End: 2023-09-20

## 2023-09-22 ENCOUNTER — TRANSCRIPTION ENCOUNTER (OUTPATIENT)
Age: 83
End: 2023-09-22

## 2023-09-22 ENCOUNTER — APPOINTMENT (OUTPATIENT)
Dept: CARE COORDINATION | Facility: HOME HEALTH | Age: 83
End: 2023-09-22
Payer: MEDICARE

## 2023-09-22 VITALS
SYSTOLIC BLOOD PRESSURE: 120 MMHG | DIASTOLIC BLOOD PRESSURE: 72 MMHG | HEART RATE: 70 BPM | OXYGEN SATURATION: 99 % | RESPIRATION RATE: 15 BRPM

## 2023-09-22 DIAGNOSIS — E03.9 HYPOTHYROIDISM, UNSPECIFIED: ICD-10-CM

## 2023-09-22 DIAGNOSIS — G62.9 POLYNEUROPATHY, UNSPECIFIED: ICD-10-CM

## 2023-09-22 PROCEDURE — 99495 TRANSJ CARE MGMT MOD F2F 14D: CPT

## 2023-09-22 RX ORDER — LEVOTHYROXINE SODIUM 0.07 MG/1
75 TABLET ORAL DAILY
Refills: 0 | Status: ACTIVE | COMMUNITY
Start: 2023-09-22

## 2023-09-22 RX ORDER — PANTOPRAZOLE 40 MG/1
40 TABLET, DELAYED RELEASE ORAL DAILY
Qty: 90 | Refills: 0 | Status: ACTIVE | COMMUNITY
Start: 2023-09-22

## 2023-09-23 DIAGNOSIS — I25.84 CORONARY ATHEROSCLEROSIS DUE TO CALCIFIED CORONARY LESION: ICD-10-CM

## 2023-09-23 DIAGNOSIS — Z87.891 PERSONAL HISTORY OF NICOTINE DEPENDENCE: ICD-10-CM

## 2023-09-23 DIAGNOSIS — Z79.890 HORMONE REPLACEMENT THERAPY: ICD-10-CM

## 2023-09-23 DIAGNOSIS — G62.9 POLYNEUROPATHY, UNSPECIFIED: ICD-10-CM

## 2023-09-23 DIAGNOSIS — E03.9 HYPOTHYROIDISM, UNSPECIFIED: ICD-10-CM

## 2023-09-23 DIAGNOSIS — I10 ESSENTIAL (PRIMARY) HYPERTENSION: ICD-10-CM

## 2023-09-23 DIAGNOSIS — Z28.21 IMMUNIZATION NOT CARRIED OUT BECAUSE OF PATIENT REFUSAL: ICD-10-CM

## 2023-09-23 DIAGNOSIS — I21.21 ST ELEVATION (STEMI) MYOCARDIAL INFARCTION INVOLVING LEFT CIRCUMFLEX CORONARY ARTERY: ICD-10-CM

## 2023-09-23 DIAGNOSIS — I25.119 ATHEROSCLEROTIC HEART DISEASE OF NATIVE CORONARY ARTERY WITH UNSPECIFIED ANGINA PECTORIS: ICD-10-CM

## 2023-09-23 DIAGNOSIS — M54.16 RADICULOPATHY, LUMBAR REGION: ICD-10-CM

## 2023-09-23 DIAGNOSIS — I25.2 OLD MYOCARDIAL INFARCTION: ICD-10-CM

## 2023-09-23 DIAGNOSIS — E78.5 HYPERLIPIDEMIA, UNSPECIFIED: ICD-10-CM

## 2023-09-23 DIAGNOSIS — R07.9 CHEST PAIN, UNSPECIFIED: ICD-10-CM

## 2023-10-03 ENCOUNTER — TRANSCRIPTION ENCOUNTER (OUTPATIENT)
Age: 83
End: 2023-10-03

## 2023-10-06 ENCOUNTER — APPOINTMENT (OUTPATIENT)
Dept: CARDIOLOGY | Facility: CLINIC | Age: 83
End: 2023-10-06
Payer: MEDICARE

## 2023-10-06 VITALS
WEIGHT: 180 LBS | SYSTOLIC BLOOD PRESSURE: 126 MMHG | BODY MASS INDEX: 28.93 KG/M2 | HEART RATE: 71 BPM | HEIGHT: 66 IN | DIASTOLIC BLOOD PRESSURE: 68 MMHG

## 2023-10-06 DIAGNOSIS — I21.3 ST ELEVATION (STEMI) MYOCARDIAL INFARCTION OF UNSPECIFIED SITE: ICD-10-CM

## 2023-10-06 DIAGNOSIS — Z87.891 PERSONAL HISTORY OF NICOTINE DEPENDENCE: ICD-10-CM

## 2023-10-06 DIAGNOSIS — Z86.69 PERSONAL HISTORY OF OTHER DISEASES OF THE NERVOUS SYSTEM AND SENSE ORGANS: ICD-10-CM

## 2023-10-06 DIAGNOSIS — Z80.41 FAMILY HISTORY OF MALIGNANT NEOPLASM OF OVARY: ICD-10-CM

## 2023-10-06 PROCEDURE — 99214 OFFICE O/P EST MOD 30 MIN: CPT

## 2023-10-06 PROCEDURE — 93000 ELECTROCARDIOGRAM COMPLETE: CPT

## 2023-10-06 RX ORDER — LOSARTAN POTASSIUM 25 MG/1
25 TABLET, FILM COATED ORAL DAILY
Qty: 90 | Refills: 3 | Status: ACTIVE | COMMUNITY
Start: 2023-09-22 | End: 1900-01-01

## 2023-10-06 RX ORDER — METOPROLOL SUCCINATE 25 MG/1
25 TABLET, EXTENDED RELEASE ORAL DAILY
Qty: 90 | Refills: 3 | Status: ACTIVE | COMMUNITY
Start: 2023-09-22 | End: 1900-01-01

## 2023-10-06 RX ORDER — TRAMADOL HYDROCHLORIDE 50 MG/1
50 TABLET, COATED ORAL 3 TIMES DAILY
Refills: 0 | Status: ACTIVE | COMMUNITY
Start: 2023-10-06

## 2023-10-06 RX ORDER — KRILL/OM-3/DHA/EPA/PHOSPHO/AST 1000-230MG
81 CAPSULE ORAL DAILY
Qty: 90 | Refills: 2 | Status: ACTIVE | COMMUNITY
Start: 2023-09-22 | End: 1900-01-01

## 2023-10-06 RX ORDER — TICAGRELOR 90 MG/1
90 TABLET ORAL TWICE DAILY
Qty: 180 | Refills: 2 | Status: ACTIVE | COMMUNITY
Start: 2023-09-22 | End: 1900-01-01

## 2023-10-13 ENCOUNTER — TRANSCRIPTION ENCOUNTER (OUTPATIENT)
Age: 83
End: 2023-10-13

## 2023-10-18 ENCOUNTER — TRANSCRIPTION ENCOUNTER (OUTPATIENT)
Age: 83
End: 2023-10-18

## 2024-01-05 ENCOUNTER — APPOINTMENT (OUTPATIENT)
Dept: CARDIOLOGY | Facility: CLINIC | Age: 84
End: 2024-01-05
Payer: MEDICARE

## 2024-01-05 VITALS
HEART RATE: 70 BPM | SYSTOLIC BLOOD PRESSURE: 142 MMHG | BODY MASS INDEX: 28.93 KG/M2 | DIASTOLIC BLOOD PRESSURE: 80 MMHG | WEIGHT: 180 LBS | HEIGHT: 66 IN

## 2024-01-05 PROCEDURE — 93000 ELECTROCARDIOGRAM COMPLETE: CPT

## 2024-01-05 PROCEDURE — 99214 OFFICE O/P EST MOD 30 MIN: CPT

## 2024-01-05 RX ORDER — ATORVASTATIN CALCIUM 20 MG/1
20 TABLET, FILM COATED ORAL
Qty: 90 | Refills: 3 | Status: ACTIVE | COMMUNITY
Start: 2023-09-22 | End: 1900-01-01

## 2024-01-07 NOTE — PHYSICAL EXAM
[No Acute Distress] : no acute distress [Normal Venous Pressure] : normal venous pressure [No Carotid Bruit] : no carotid bruit [Normal S1, S2] : normal S1, S2 [No Murmur] : no murmur [No Rub] : no rub [No Gallop] : no gallop [Clear Lung Fields] : clear lung fields [No Respiratory Distress] : no respiratory distress  [Non Tender] : non-tender [No Masses/organomegaly] : no masses/organomegaly [Normal Gait] : normal gait [No Edema] : no edema [Venous varicosities] : venous varicosities [Moves all extremities] : moves all extremities [No Focal Deficits] : no focal deficits [Normal Speech] : normal speech [Alert and Oriented] : alert and oriented [Normal memory] : normal memory [de-identified] : fragile skin multiple purpuras

## 2024-01-07 NOTE — HISTORY OF PRESENT ILLNESS
[FreeTextEntry1] : Pt is a 84yo Female  with PMHx of hypothyroidism, back pain and peripheral neuropathy 9/23 Hospitalization. S/P inferolateral STEMI  S/p PCI/VIET OM1 x 2 on 9/13/23 and staged PCI with VIET to mRCA.  TTE 09/14/23 50%-55% Grade I diastolic dysfunction  Pt denies chest pain, no SOB, no edema  Tolerates all medications well.  No s.s of bleeding Pt c/o chronic back pain was on tramadol prior to MI  + fatigue, takes short naps during the day

## 2024-01-07 NOTE — REVIEW OF SYSTEMS
[Feeling Fatigued] : feeling fatigued [Joint Pain] : joint pain [Negative] : Heme/Lymph [Dyspnea on exertion] : not dyspnea during exertion [Chest Discomfort] : no chest discomfort [Lower Ext Edema] : no extremity edema [Leg Claudication] : no intermittent leg claudication [Palpitations] : no palpitations [Syncope] : no syncope [Orthopnea] : no orthopnea

## 2024-01-07 NOTE — ASSESSMENT
[FreeTextEntry1] : #S/p STEMI #CAD S/p PCI #HTN #HLD  Plan: -Cont ASA, Brilinta , decrease Atorvastatin -Cont Metoprolol, cont Losartan -Labs reviewed and d/w the pt -Will schedule staged PCI for residual LCX ds if pt becomes symptomatic -Low Chol, Low Na diet -Activities as tolerated -Blood Pressure Device for B/p monitoring at home -Will discuss Cardiac Rehab during next visit. -F/u in 3-4 months

## 2024-05-24 ENCOUNTER — APPOINTMENT (OUTPATIENT)
Dept: CARDIOLOGY | Facility: CLINIC | Age: 84
End: 2024-05-24

## 2024-05-31 ENCOUNTER — APPOINTMENT (OUTPATIENT)
Dept: CARDIOLOGY | Facility: CLINIC | Age: 84
End: 2024-05-31
Payer: MEDICARE

## 2024-05-31 VITALS
DIASTOLIC BLOOD PRESSURE: 70 MMHG | HEIGHT: 66 IN | HEART RATE: 69 BPM | SYSTOLIC BLOOD PRESSURE: 158 MMHG | BODY MASS INDEX: 28.93 KG/M2 | WEIGHT: 180 LBS

## 2024-05-31 VITALS — DIASTOLIC BLOOD PRESSURE: 74 MMHG | SYSTOLIC BLOOD PRESSURE: 143 MMHG

## 2024-05-31 DIAGNOSIS — Z95.5 PRESENCE OF CORONARY ANGIOPLASTY IMPLANT AND GRAFT: ICD-10-CM

## 2024-05-31 DIAGNOSIS — I25.10 ATHEROSCLEROTIC HEART DISEASE OF NATIVE CORONARY ARTERY W/OUT ANGINA PECTORIS: ICD-10-CM

## 2024-05-31 DIAGNOSIS — Z00.00 ENCOUNTER FOR GENERAL ADULT MEDICAL EXAMINATION W/OUT ABNORMAL FINDINGS: ICD-10-CM

## 2024-05-31 DIAGNOSIS — I10 ESSENTIAL (PRIMARY) HYPERTENSION: ICD-10-CM

## 2024-05-31 DIAGNOSIS — E78.5 HYPERLIPIDEMIA, UNSPECIFIED: ICD-10-CM

## 2024-05-31 PROCEDURE — 93000 ELECTROCARDIOGRAM COMPLETE: CPT

## 2024-05-31 PROCEDURE — 99214 OFFICE O/P EST MOD 30 MIN: CPT

## 2024-06-02 NOTE — ASSESSMENT
[FreeTextEntry1] : #S/p STEMI #CAD S/p PCI #HTN #HLD #Sciatica   Plan: Cont ASA, Brilinta , cont Atorvastatin Cont Metoprolol, cont Losartan Will schedule staged PCI for residual LCX ds if pt becomes symptomatic Low Chol, Low Na diet Activities as tolerated Cont  B/p monitoring at home Not able to participate with Cardiac Rehab due to pain Will discuss during next visit after pain management May proceed with pain management  Epidural inj  in Aug of 2024  May hold Brilinta 5 days prior in Aug, cont ASA 81mg  Repeat fasting labs prior to next visit  F/u in 3-4 months

## 2024-06-02 NOTE — HISTORY OF PRESENT ILLNESS
[FreeTextEntry1] : Pt is a 85 yo Female with PMHx of hypothyroidism, back pain and peripheral neuropathy 9/23 Hospitalization. S/P inferolateral STEMI  S/p PCI/VIET OM1 x 2 on 9/13/23 and staged PCI with VIET to mRCA.  TTE 09/14/23 50%-55% Grade I diastolic dysfunction  Pt denies chest pain, no SOB, no edema  Tolerates all medications well.  No s.s of bleeding Pt c/o chronic back pain was on tramadol QD prior to MI  OOB with R/W  + fatigue, takes short naps during the day ET decreased due to pain  12/04/23 Chol 101 LDL 36 TRIG 81

## 2024-06-02 NOTE — REVIEW OF SYSTEMS
[Feeling Fatigued] : feeling fatigued [Joint Pain] : joint pain [Negative] : Heme/Lymph [Dyspnea on exertion] : not dyspnea during exertion [Lower Ext Edema] : no extremity edema [Chest Discomfort] : no chest discomfort [Leg Claudication] : no intermittent leg claudication [Palpitations] : no palpitations [Orthopnea] : no orthopnea [Syncope] : no syncope

## 2024-06-02 NOTE — PHYSICAL EXAM
[No Acute Distress] : no acute distress [Normal Venous Pressure] : normal venous pressure [No Carotid Bruit] : no carotid bruit [Normal S1, S2] : normal S1, S2 [No Murmur] : no murmur [No Rub] : no rub [No Gallop] : no gallop [Clear Lung Fields] : clear lung fields [No Respiratory Distress] : no respiratory distress  [Non Tender] : non-tender [No Masses/organomegaly] : no masses/organomegaly [Normal Gait] : normal gait [No Edema] : no edema [Venous varicosities] : venous varicosities [Moves all extremities] : moves all extremities [No Focal Deficits] : no focal deficits [Normal Speech] : normal speech [Alert and Oriented] : alert and oriented [Normal memory] : normal memory [de-identified] : fragile skin multiple purpuras

## 2024-11-01 ENCOUNTER — APPOINTMENT (OUTPATIENT)
Dept: CARDIOLOGY | Facility: CLINIC | Age: 84
End: 2024-11-01
Payer: MEDICARE

## 2024-11-01 VITALS
BODY MASS INDEX: 28.77 KG/M2 | HEIGHT: 66 IN | DIASTOLIC BLOOD PRESSURE: 80 MMHG | WEIGHT: 179 LBS | SYSTOLIC BLOOD PRESSURE: 142 MMHG | HEART RATE: 82 BPM

## 2024-11-01 DIAGNOSIS — Z95.5 PRESENCE OF CORONARY ANGIOPLASTY IMPLANT AND GRAFT: ICD-10-CM

## 2024-11-01 DIAGNOSIS — I10 ESSENTIAL (PRIMARY) HYPERTENSION: ICD-10-CM

## 2024-11-01 DIAGNOSIS — E78.5 HYPERLIPIDEMIA, UNSPECIFIED: ICD-10-CM

## 2024-11-01 DIAGNOSIS — I25.10 ATHEROSCLEROTIC HEART DISEASE OF NATIVE CORONARY ARTERY W/OUT ANGINA PECTORIS: ICD-10-CM

## 2024-11-01 DIAGNOSIS — I21.3 ST ELEVATION (STEMI) MYOCARDIAL INFARCTION OF UNSPECIFIED SITE: ICD-10-CM

## 2024-11-01 PROCEDURE — 99214 OFFICE O/P EST MOD 30 MIN: CPT

## 2024-11-01 PROCEDURE — 93000 ELECTROCARDIOGRAM COMPLETE: CPT

## 2024-11-01 NOTE — PHYSICAL EXAM
[No Acute Distress] : no acute distress [Normal Venous Pressure] : normal venous pressure [No Carotid Bruit] : no carotid bruit [No Murmur] : no murmur [Normal S1, S2] : normal S1, S2 [No Rub] : no rub [No Gallop] : no gallop [Clear Lung Fields] : clear lung fields [No Respiratory Distress] : no respiratory distress  [Non Tender] : non-tender [No Masses/organomegaly] : no masses/organomegaly [Normal Gait] : normal gait [No Edema] : no edema [Venous varicosities] : venous varicosities [Moves all extremities] : moves all extremities [No Focal Deficits] : no focal deficits [Normal Speech] : normal speech [Alert and Oriented] : alert and oriented [Normal memory] : normal memory [de-identified] : fragile skin multiple purpuras

## 2024-11-01 NOTE — REVIEW OF SYSTEMS
[Feeling Fatigued] : feeling fatigued [Joint Pain] : joint pain [Negative] : Heme/Lymph [Dyspnea on exertion] : not dyspnea during exertion [Chest Discomfort] : no chest discomfort [Lower Ext Edema] : no extremity edema [Leg Claudication] : no intermittent leg claudication [Palpitations] : no palpitations [Orthopnea] : no orthopnea [Syncope] : no syncope

## 2024-11-01 NOTE — ASSESSMENT
[FreeTextEntry1] : #S/p STEMI #CAD S/p PCI #HTN #HLD #Sciatica   Plan: Cont ASA, dc Brilinta , cont Atorvastatin Cont Metoprolol, cont Losartan Will schedule staged PCI for residual LCX ds if pt becomes symptomatic Low Chol, Low Na diet Activities as tolerated Cont  B/p monitoring at home Not able to participate with Cardiac Rehab due to pain Will discuss during next visit after pain management May proceed with pain management Epidural  Repeat fasting labs prior to next visit  F/u in 6 months

## 2025-01-02 ENCOUNTER — RX RENEWAL (OUTPATIENT)
Age: 85
End: 2025-01-02

## 2025-08-08 ENCOUNTER — RX RENEWAL (OUTPATIENT)
Age: 85
End: 2025-08-08

## 2025-09-09 ENCOUNTER — TRANSCRIPTION ENCOUNTER (OUTPATIENT)
Age: 85
End: 2025-09-09

## 2025-09-10 ENCOUNTER — TRANSCRIPTION ENCOUNTER (OUTPATIENT)
Age: 85
End: 2025-09-10

## 2025-09-18 ENCOUNTER — APPOINTMENT (OUTPATIENT)
Dept: ORTHOPEDIC SURGERY | Facility: CLINIC | Age: 85
End: 2025-09-18
Payer: MEDICARE

## 2025-09-18 DIAGNOSIS — S72.141D DISPLACED INTERTROCHANTERIC FRACTURE OF RIGHT FEMUR, SUBSEQUENT ENCOUNTER FOR CLOSED FRACTURE WITH ROUTINE HEALING: ICD-10-CM

## 2025-09-18 PROCEDURE — 73503 X-RAY EXAM HIP UNI 4/> VIEWS: CPT | Mod: RT

## 2025-09-18 PROCEDURE — 99024 POSTOP FOLLOW-UP VISIT: CPT
